# Patient Record
Sex: MALE | Race: WHITE | Employment: FULL TIME | URBAN - NONMETROPOLITAN AREA
[De-identification: names, ages, dates, MRNs, and addresses within clinical notes are randomized per-mention and may not be internally consistent; named-entity substitution may affect disease eponyms.]

---

## 2018-12-26 ENCOUNTER — APPOINTMENT (OUTPATIENT)
Dept: GENERAL RADIOLOGY | Age: 38
End: 2018-12-26
Payer: COMMERCIAL

## 2018-12-26 ENCOUNTER — HOSPITAL ENCOUNTER (EMERGENCY)
Age: 38
Discharge: HOME | End: 2018-12-27
Payer: COMMERCIAL

## 2018-12-26 VITALS
OXYGEN SATURATION: 97 % | RESPIRATION RATE: 16 BRPM | TEMPERATURE: 98.6 F | DIASTOLIC BLOOD PRESSURE: 97 MMHG | SYSTOLIC BLOOD PRESSURE: 138 MMHG | HEART RATE: 65 BPM

## 2018-12-26 DIAGNOSIS — M79.642 HAND PAIN, LEFT: ICD-10-CM

## 2018-12-26 DIAGNOSIS — I10 ESSENTIAL HYPERTENSION: ICD-10-CM

## 2018-12-26 DIAGNOSIS — R11.0 NAUSEA: Primary | ICD-10-CM

## 2018-12-26 LAB
ALBUMIN SERPL-MCNC: 4.7 G/DL (ref 3.5–5.1)
ALP BLD-CCNC: 43 U/L (ref 38–126)
ALT SERPL-CCNC: 52 U/L (ref 11–66)
AMPHETAMINE+METHAMPHETAMINE URINE SCREEN: NEGATIVE
ANION GAP SERPL CALCULATED.3IONS-SCNC: 16 MEQ/L (ref 8–16)
AST SERPL-CCNC: 55 U/L (ref 5–40)
BARBITURATE QUANTITATIVE URINE: NEGATIVE
BASOPHILS # BLD: 0.4 %
BASOPHILS ABSOLUTE: 0 THOU/MM3 (ref 0–0.1)
BENZODIAZEPINE QUANTITATIVE URINE: NEGATIVE
BILIRUB SERPL-MCNC: 0.3 MG/DL (ref 0.3–1.2)
BILIRUBIN DIRECT: < 0.2 MG/DL (ref 0–0.3)
BUN BLDV-MCNC: 15 MG/DL (ref 7–22)
CALCIUM SERPL-MCNC: 9.4 MG/DL (ref 8.5–10.5)
CANNABINOID QUANTITATIVE URINE: NEGATIVE
CHLORIDE BLD-SCNC: 98 MEQ/L (ref 98–111)
CO2: 27 MEQ/L (ref 23–33)
COCAINE METABOLITE QUANTITATIVE URINE: NEGATIVE
CREAT SERPL-MCNC: 0.9 MG/DL (ref 0.4–1.2)
EKG ATRIAL RATE: 79 BPM
EKG P AXIS: 67 DEGREES
EKG P-R INTERVAL: 154 MS
EKG Q-T INTERVAL: 400 MS
EKG QRS DURATION: 94 MS
EKG QTC CALCULATION (BAZETT): 458 MS
EKG R AXIS: 26 DEGREES
EKG T AXIS: 49 DEGREES
EKG VENTRICULAR RATE: 79 BPM
EOSINOPHIL # BLD: 2.8 %
EOSINOPHILS ABSOLUTE: 0.2 THOU/MM3 (ref 0–0.4)
ERYTHROCYTE [DISTWIDTH] IN BLOOD BY AUTOMATED COUNT: 13 % (ref 11.5–14.5)
ERYTHROCYTE [DISTWIDTH] IN BLOOD BY AUTOMATED COUNT: 41.2 FL (ref 35–45)
GFR SERPL CREATININE-BSD FRML MDRD: > 90 ML/MIN/1.73M2
GLUCOSE BLD-MCNC: 101 MG/DL (ref 70–108)
HCT VFR BLD CALC: 39.4 % (ref 42–52)
HEMOGLOBIN: 13.2 GM/DL (ref 14–18)
IMMATURE GRANS (ABS): 0.02 THOU/MM3 (ref 0–0.07)
IMMATURE GRANULOCYTES: 0.3 %
LIPASE: 26 U/L (ref 5.6–51.3)
LYMPHOCYTES # BLD: 27.8 %
LYMPHOCYTES ABSOLUTE: 2.1 THOU/MM3 (ref 1–4.8)
MCH RBC QN AUTO: 29.3 PG (ref 26–33)
MCHC RBC AUTO-ENTMCNC: 33.5 GM/DL (ref 32.2–35.5)
MCV RBC AUTO: 87.6 FL (ref 80–94)
MONOCYTES # BLD: 8.8 %
MONOCYTES ABSOLUTE: 0.7 THOU/MM3 (ref 0.4–1.3)
NUCLEATED RED BLOOD CELLS: 0 /100 WBC
OPIATES, URINE: NEGATIVE
OSMOLALITY CALCULATION: 282.2 MOSMOL/KG (ref 275–300)
OXYCODONE: NEGATIVE
PHENCYCLIDINE QUANTITATIVE URINE: NEGATIVE
PLATELET # BLD: 290 THOU/MM3 (ref 130–400)
PMV BLD AUTO: 9.5 FL (ref 9.4–12.4)
POTASSIUM SERPL-SCNC: 3.3 MEQ/L (ref 3.5–5.2)
RBC # BLD: 4.5 MILL/MM3 (ref 4.7–6.1)
SEG NEUTROPHILS: 59.9 %
SEGMENTED NEUTROPHILS ABSOLUTE COUNT: 4.6 THOU/MM3 (ref 1.8–7.7)
SODIUM BLD-SCNC: 141 MEQ/L (ref 135–145)
TOTAL PROTEIN: 7.4 G/DL (ref 6.1–8)
TROPONIN T: < 0.01 NG/ML
WBC # BLD: 7.7 THOU/MM3 (ref 4.8–10.8)

## 2018-12-26 PROCEDURE — 93005 ELECTROCARDIOGRAM TRACING: CPT | Performed by: PHYSICIAN ASSISTANT

## 2018-12-26 PROCEDURE — 99283 EMERGENCY DEPT VISIT LOW MDM: CPT

## 2018-12-26 PROCEDURE — 71046 X-RAY EXAM CHEST 2 VIEWS: CPT

## 2018-12-26 PROCEDURE — 83690 ASSAY OF LIPASE: CPT

## 2018-12-26 PROCEDURE — 36415 COLL VENOUS BLD VENIPUNCTURE: CPT

## 2018-12-26 PROCEDURE — 80307 DRUG TEST PRSMV CHEM ANLYZR: CPT

## 2018-12-26 PROCEDURE — 6360000002 HC RX W HCPCS: Performed by: NURSE PRACTITIONER

## 2018-12-26 PROCEDURE — 84484 ASSAY OF TROPONIN QUANT: CPT

## 2018-12-26 PROCEDURE — 85025 COMPLETE CBC W/AUTO DIFF WBC: CPT

## 2018-12-26 PROCEDURE — 82248 BILIRUBIN DIRECT: CPT

## 2018-12-26 PROCEDURE — 80053 COMPREHEN METABOLIC PANEL: CPT

## 2018-12-26 PROCEDURE — 6370000000 HC RX 637 (ALT 250 FOR IP): Performed by: NURSE PRACTITIONER

## 2018-12-26 RX ORDER — ONDANSETRON 4 MG/1
4 TABLET, ORALLY DISINTEGRATING ORAL ONCE
Status: COMPLETED | OUTPATIENT
Start: 2018-12-26 | End: 2018-12-26

## 2018-12-26 RX ORDER — POTASSIUM CHLORIDE 750 MG/1
40 TABLET, FILM COATED, EXTENDED RELEASE ORAL ONCE
Status: COMPLETED | OUTPATIENT
Start: 2018-12-26 | End: 2018-12-26

## 2018-12-26 RX ORDER — LOSARTAN POTASSIUM 100 MG/1
100 TABLET ORAL NIGHTLY
COMMUNITY
End: 2021-05-14 | Stop reason: SDUPTHER

## 2018-12-26 RX ADMIN — ONDANSETRON 4 MG: 4 TABLET, ORALLY DISINTEGRATING ORAL at 23:22

## 2018-12-26 RX ADMIN — POTASSIUM CHLORIDE 40 MEQ: 750 TABLET, EXTENDED RELEASE ORAL at 23:22

## 2018-12-26 ASSESSMENT — ENCOUNTER SYMPTOMS
COUGH: 0
VOMITING: 0
SHORTNESS OF BREATH: 0
NAUSEA: 1

## 2018-12-26 ASSESSMENT — PAIN DESCRIPTION - PAIN TYPE: TYPE: ACUTE PAIN

## 2018-12-26 ASSESSMENT — PAIN DESCRIPTION - FREQUENCY: FREQUENCY: CONTINUOUS

## 2018-12-26 ASSESSMENT — PAIN DESCRIPTION - LOCATION: LOCATION: HAND

## 2018-12-26 ASSESSMENT — PAIN DESCRIPTION - DESCRIPTORS: DESCRIPTORS: TINGLING

## 2018-12-26 ASSESSMENT — PAIN SCALES - GENERAL: PAINLEVEL_OUTOF10: 3

## 2018-12-27 PROCEDURE — 93010 ELECTROCARDIOGRAM REPORT: CPT | Performed by: INTERNAL MEDICINE

## 2018-12-27 RX ORDER — ONDANSETRON 4 MG/1
4 TABLET, ORALLY DISINTEGRATING ORAL EVERY 8 HOURS PRN
Qty: 20 TABLET | Refills: 0 | Status: SHIPPED | OUTPATIENT
Start: 2018-12-27 | End: 2021-03-16

## 2019-01-15 ENCOUNTER — HOSPITAL ENCOUNTER (OUTPATIENT)
Dept: NON INVASIVE DIAGNOSTICS | Age: 39
Discharge: HOME | End: 2019-01-15
Payer: COMMERCIAL

## 2019-01-15 VITALS — HEIGHT: 70 IN | WEIGHT: 180 LBS | BODY MASS INDEX: 25.77 KG/M2

## 2019-01-15 PROCEDURE — 93017 CV STRESS TEST TRACING ONLY: CPT | Performed by: INTERNAL MEDICINE

## 2020-02-11 ENCOUNTER — HOSPITAL ENCOUNTER (OUTPATIENT)
Dept: SLEEP CENTER | Age: 40
Discharge: HOME | End: 2020-02-13
Payer: COMMERCIAL

## 2020-02-11 ENCOUNTER — OFFICE VISIT (OUTPATIENT)
Dept: CARDIOLOGY CLINIC | Age: 40
End: 2020-02-11
Payer: COMMERCIAL

## 2020-02-11 ENCOUNTER — INITIAL CONSULT (OUTPATIENT)
Dept: PULMONOLOGY | Age: 40
End: 2020-02-11
Payer: COMMERCIAL

## 2020-02-11 VITALS
WEIGHT: 180.2 LBS | BODY MASS INDEX: 25.8 KG/M2 | DIASTOLIC BLOOD PRESSURE: 81 MMHG | SYSTOLIC BLOOD PRESSURE: 133 MMHG | HEART RATE: 76 BPM | HEIGHT: 70 IN

## 2020-02-11 VITALS
SYSTOLIC BLOOD PRESSURE: 122 MMHG | OXYGEN SATURATION: 97 % | HEIGHT: 70 IN | DIASTOLIC BLOOD PRESSURE: 72 MMHG | BODY MASS INDEX: 25.2 KG/M2 | HEART RATE: 71 BPM | WEIGHT: 176 LBS

## 2020-02-11 PROCEDURE — 1036F TOBACCO NON-USER: CPT | Performed by: INTERNAL MEDICINE

## 2020-02-11 PROCEDURE — G8484 FLU IMMUNIZE NO ADMIN: HCPCS | Performed by: INTERNAL MEDICINE

## 2020-02-11 PROCEDURE — G8419 CALC BMI OUT NRM PARAM NOF/U: HCPCS | Performed by: INTERNAL MEDICINE

## 2020-02-11 PROCEDURE — 95810 POLYSOM 6/> YRS 4/> PARAM: CPT

## 2020-02-11 PROCEDURE — 93000 ELECTROCARDIOGRAM COMPLETE: CPT | Performed by: INTERNAL MEDICINE

## 2020-02-11 PROCEDURE — G8427 DOCREV CUR MEDS BY ELIG CLIN: HCPCS | Performed by: INTERNAL MEDICINE

## 2020-02-11 PROCEDURE — 99203 OFFICE O/P NEW LOW 30 MIN: CPT | Performed by: INTERNAL MEDICINE

## 2020-02-11 PROCEDURE — 99244 OFF/OP CNSLTJ NEW/EST MOD 40: CPT | Performed by: INTERNAL MEDICINE

## 2020-02-11 RX ORDER — ASCORBIC ACID 1000 MG
TABLET ORAL DAILY
COMMUNITY

## 2020-02-11 NOTE — PROGRESS NOTES
Mallampati Score: 4    General appearance: alert and oriented to person, place and time BMI 25  Nose: Nares normal. Septum midline. Mucosa normal. No drainage or sinus tenderness. Oropharynx:  Normal  tongue, crowded pharynx, mallampati class 2, tonsils grade 1  Lungs: clear to auscultation bilaterally  Heart: regular rate and rhythm, S1, S2 normal, no murmur, click, rub or gallop  Extremities: extremities normal, atraumatic, no cyanosis or edema  Neurologic: Mental status: Alert, oriented, thought content appropriate      Assessment   Orders Placed This Encounter   Procedures    Baseline Diagnostic Sleep Study     Standing Status:   Future     Standing Expiration Date:   2/11/2021     Order Specific Question:   Adult or Pediatric     Answer:   Adult Study (>7 Years)     Order Specific Question:   Location For Sleep Study     Answer:   [address] [facility]     Order Specific Question:   Select Sleep Lab Location     Answer:   [address]      PSG  Mask Desensitization and Pre study teaching? No  Weight Loss Information Given? No  Sleep Hygiene Discussed?  Yes

## 2020-02-11 NOTE — COMMUNICATION BODY
Comment: moderate    Drug use: No       No Known Allergies    Current Outpatient Medications   Medication Sig Dispense Refill    aspirin 81 MG tablet Take 81 mg by mouth daily      Coenzyme Q10 (CO Q 10) 10 MG CAPS Take by mouth      losartan (COZAAR) 100 MG tablet Take 100 mg by mouth daily       ibuprofen (ADVIL;MOTRIN) 200 MG tablet Take 200 mg by mouth as needed for Pain.  Cholecalciferol (VITAMIN D-3 PO) Take  by mouth daily. 1-2 tabs      simvastatin (ZOCOR) 20 MG tablet Take 20 mg by mouth nightly.  MULTIPLE VITAMIN PO Take  by mouth daily.  ondansetron (ZOFRAN ODT) 4 MG disintegrating tablet Take 1 tablet by mouth every 8 hours as needed for Nausea (Patient not taking: Reported on 2/11/2020) 20 tablet 0    fexofenadine (ALLEGRA) 180 MG tablet Take 180 mg by mouth daily as needed.  fluticasone (FLONASE) 50 MCG/ACT nasal spray 1 spray by Nasal route as needed. No current facility-administered medications for this visit. Family History   Problem Relation Age of Onset    High Cholesterol Father     Other Father         afib    Stroke Maternal Grandfather     Heart Disease Paternal Grandmother     Stroke Paternal Grandfather     Prostate Cancer Neg Hx         Any family history of any sleep problems or any one in your family on CPAP? Yes    Caffeine Intake: How much soda (pop), coffee, tea, power drinks do you ingest per day? 3 per day. Employment History:  Where do you work? [employer], [employer]  What are your shifts? 8 am to 4 pm    Any recent changes in shifts or hours? no    Physical Exam:    HEIGHTHeight: 5' 10\" (177.8 cm) WEIGHTWeight: 176 lb (79.8 kg)    BMI:  Body mass index is 25.25 kg/m².   Neck Size: 15.5 inches   Oxygen Sat: room air    ESS: 9   Vitals: /72 (Site: Left Upper Arm, Position: Sitting, Cuff Size: Medium Adult)   Pulse 71   Ht 5' 10\" (1.778 m)   Wt 176 lb (79.8 kg)   SpO2 97% Comment: on room air at rest  BMI 25.25 kg/m²

## 2020-02-11 NOTE — PROGRESS NOTES
NPO after midnight  [employer] and drivers license  Wear comfortable clean clothing  Do not bring jewelry   Shower night before and morning of surgery with a liquid antibacterial soap  Bring medications in original bottles  Follow all instructions give by your physician   needed at discharge  Call [phone] for any questions

## 2020-02-11 NOTE — PROGRESS NOTES
abnormalities. A complete report on coronary calcification scoring  will be provided by [facility] ([facility]) Cardiology. Calcified mediastinal lymph nodes consistent with remote granulomatous disease. Mild atherosclerotic change. The thoracic spine demonstrates moderate  degenerative changes at multiple levels. IMPRESSION:   No significant noncardiac abnormality of the visualized portions of the chest or  abdomen. A complete report on coronary calcification scoring will be provided by [facility] ([facility]) Cardiology. Current Outpatient Medications:     aspirin 81 MG tablet, Take 81 mg by mouth daily, Disp: , Rfl:     Coenzyme Q10 (CO Q 10) 10 MG CAPS, Take by mouth, Disp: , Rfl:     ondansetron (ZOFRAN ODT) 4 MG disintegrating tablet, Take 1 tablet by mouth every 8 hours as needed for Nausea (Patient not taking: Reported on 2/11/2020), Disp: 20 tablet, Rfl: 0    losartan (COZAAR) 100 MG tablet, Take 100 mg by mouth daily , Disp: , Rfl:     ibuprofen (ADVIL;MOTRIN) 200 MG tablet, Take 200 mg by mouth as needed for Pain., Disp: , Rfl:     Cholecalciferol (VITAMIN D-3 PO), Take  by mouth daily. 1-2 tabs, Disp: , Rfl:     simvastatin (ZOCOR) 20 MG tablet, Take 20 mg by mouth nightly.  , Disp: , Rfl:     fexofenadine (ALLEGRA) 180 MG tablet, Take 180 mg by mouth daily as needed. , Disp: , Rfl:     fluticasone (FLONASE) 50 MCG/ACT nasal spray, 1 spray by Nasal route as needed. , Disp: , Rfl:     MULTIPLE VITAMIN PO, Take  by mouth daily. , Disp: , Rfl:     Past Medical History  July Reveles  has a past medical history of AR (allergic rhinitis), Hypercholesteremia, and Hypertension. Social History  July Reveles  reports that he has never smoked. He has never used smokeless tobacco. He reports current alcohol use. He reports that he does not use drugs.     Family History  July Reveles family history includes Heart Disease in his paternal grandmother; High Cholesterol in his father; medicine for DEANDRA evaluation, sleep study is planned. Exercise EKG stress test in 01/2019 was negative for ischemia. He recently underwent coronary CT scan on 1/24/2020. He has family Hx of CAD. Father had A Fib, heart surgery. Hi grandmother had CABG. Coronary CT scan was done and patient was referred after it was found to be abnormal. Coronary calcium score was 217, above 97th percentile. The test was ordered by PCP. The patient was previously seen for chest pain last year, stress EKG was negative. He states that he gets tired after physical activity and develops dyspnea. Patient denies chest pain, orthopnea, paroxysmal nocturnal dyspnea, palpitations, dizziness, syncope, weight gain or leg swelling. 1. Abnormal coronary   2. High calcium score  3. KOHLI  4. Fatigue  5. HTN  6. Dyslipidemia     A decision was made to proceed with cardiac catheterization with possible PCI as it it the recommended procedure for the patient. The indication, risks and benefits of the procedure and possible therapeutic consequences and alternatives were discussed with the patient. The patient was given the opportunity to ask questions and to have them answered to his/her satisfaction. Risks of the procedure include but are not limited to the following: Bleeding, hematoma including retroperitoneal hematoma, infection, pain and discomfort, injury to the aorta and other blood vessels, rhythm disturbance, low blood pressure, myocardial infarction, need for bypass surgery, stroke, kidney damage/failure, myocardial perforation, allergic reactions to sedatives/contrast material, loss of pulse/vascular compromise requiring surgery, aneurysm/pseudoaneurysm formation, possible loss of a limb/hand/leg, death. Alternatives to and omission of the suggested procedure were discussed. The patient also understands the need for DAPT if PCI is necessary. The patient had no further questions and wished to proceed; the consent form was signed.   [facility] ordered  Will need to investigate for underlying structural heart disease, will proceed with a transthoracic echocardiogram   Asa 81 mg po daily  Patient was advised to report to the ER if he has recurrent symptoms with specific instructions given about severity and duration of symptoms  The patient was instructed to check her blood pressure at home, and record the readings. She will call office if blood pressure readings are high or low, will further adjust her antihypertensive medications accordingly         Above findings and plan of care were discussed with patient in details, patient's questions were answered.      Disposition:  RTC in 12 months       Electronically signed by Toro Lay MD, [facility]    2/11/2020 at 12:34 PM

## 2020-02-12 LAB — STATUS: NORMAL

## 2020-02-14 ENCOUNTER — OFFICE VISIT (OUTPATIENT)
Dept: PULMONOLOGY | Age: 40
End: 2020-02-14
Payer: COMMERCIAL

## 2020-02-14 VITALS
OXYGEN SATURATION: 100 % | BODY MASS INDEX: 25.83 KG/M2 | HEART RATE: 64 BPM | SYSTOLIC BLOOD PRESSURE: 126 MMHG | HEIGHT: 70 IN | WEIGHT: 180.4 LBS | DIASTOLIC BLOOD PRESSURE: 72 MMHG

## 2020-02-14 PROCEDURE — 99214 OFFICE O/P EST MOD 30 MIN: CPT | Performed by: PHYSICIAN ASSISTANT

## 2020-02-14 PROCEDURE — G8427 DOCREV CUR MEDS BY ELIG CLIN: HCPCS | Performed by: PHYSICIAN ASSISTANT

## 2020-02-14 PROCEDURE — G8484 FLU IMMUNIZE NO ADMIN: HCPCS | Performed by: PHYSICIAN ASSISTANT

## 2020-02-14 PROCEDURE — G8419 CALC BMI OUT NRM PARAM NOF/U: HCPCS | Performed by: PHYSICIAN ASSISTANT

## 2020-02-14 PROCEDURE — 1036F TOBACCO NON-USER: CPT | Performed by: PHYSICIAN ASSISTANT

## 2020-02-14 ASSESSMENT — ENCOUNTER SYMPTOMS
COUGH: 0
DIARRHEA: 0
WHEEZING: 0
STRIDOR: 0
BACK PAIN: 0
SHORTNESS OF BREATH: 0
ALLERGIC/IMMUNOLOGIC NEGATIVE: 1
CHEST TIGHTNESS: 0
EYES NEGATIVE: 1
NAUSEA: 0

## 2020-02-14 NOTE — PROGRESS NOTES
[facility] [facility]    Dallin Pereira, 37 y.o.  [phone]      Pt of [facility]  Nurses Notes   Yolanda Garcia is here for a follow up after a sleep study 2/11/20  Study Results  Initial Study Date -  2/11/20  AHI -  12.9    TotalEvents -86  (Apneas  71  Hypopneas 15  Central  0)  LM w/Arousals - 3  Sleep Efficiency - 87.6 % (Total Sleep Time - 399 min)  Time with Sats below 88% - 0.2 min  Interval History       Dallin Pereira is a 37 y.o. old male who comes in to review the results of his recent sleep study, to answer questions and to explore options for treatment.  he continues to have symptoms of snoring, periods of not breathing, excessive daytime sleepiness    PMHx  Past Medical History:   Diagnosis Date    AR (allergic rhinitis)     Hypercholesteremia     BORDERLINE    Hypertension      Past Surgical History:   Procedure Laterality Date    CARDIOVASCULAR STRESS TEST      INGUINAL HERNIA REPAIR Right 6-13-14    Robot assisted-[facility]    STOMACH SURGERY  1978    Pyloric Stenosis    WISDOM TOOTH EXTRACTION  1996     Social History     Tobacco Use    Smoking status: Never Smoker    Smokeless tobacco: Never Used   Substance Use Topics    Alcohol use: Yes     Types: 4 Cans of beer per week     Comment: moderate    Drug use: No     Family History   Problem Relation Age of Onset    High Cholesterol Father     Other Father         afib    Stroke Maternal Grandfather     Heart Disease Paternal Grandmother     Stroke Paternal Grandfather     Prostate Cancer Neg Hx      Allergies  No Known Allergies  Meds  Current Outpatient Medications   Medication Sig Dispense Refill    aspirin 81 MG tablet Take 81 mg by mouth daily      Coenzyme Q10 (CO Q 10) 10 MG CAPS Take by mouth daily       ondansetron (ZOFRAN ODT) 4 MG disintegrating tablet Take 1 tablet by mouth every 8 hours as needed for Nausea 20 tablet 0    losartan (COZAAR) 100 MG tablet Take 100 mg by mouth nightly      

## 2020-02-17 NOTE — PROGRESS NOTES
[address] [place] [address]                               SLEEP STUDY REPORT    PATIENT NAME: Gerson Núñez                  :        1980  MED REC NO:   [id number]                           ROOM:  ACCOUNT NO:   [de-identified]                           ADMIT DATE: 2020  PROVIDER:     LATANYA Mancilla STUDY:  2020    REFERRING PROVIDER:  Arbutus Essex, MD    HISTORY OF PRESENT ILLNESS:  The patient is a 40-year-old gentleman. Complains of snoring, nonrestorative sleep, fragmented sleep with  frequent arousals. Weight 176 pounds, height 70 inches, BMI 25.3. METHODS:  The patient underwent digital polysomnography in compliance  with the standards and specifications from the AASM Manual including the  simultaneous recording of 3 EEG channels (F4-M1, C4-M1, and O2-M1 with  back up electrodes F3-M2, C3-M2, and O1-M2), 2 EOG channels (E1-M2, and  E2-M1,), EMG (chin, left & right leg), EKG, Nonin pulse oximetry with  less than 2 second averaging time, body position, airflow recorded by  oral-nasal thermal sensor and nasal air pressure transducer, plus  respiratory effort recorded by calibrated respiratory inductance  plethysmography (RIP), flow volume loop, sound and video. Sleep staging  and scoring followed the standard put forth by the American Academy of  Sleep Medicine and utilized the 4A obstructive hypopnea event  desaturation of 4 percent or greater. DETAILS OF THE STUDY:  Lights out 09:35 p.m., lights on 05:11 a.m. Total recording time 455 minutes, sleep period time 421 minutes, total  sleep time 399 minutes, sleep efficiency 87.6%, latency to sleep 33.6  minutes, wake after sleep onset 22.8 minutes, latency to REM 71 minutes. SLEEP STAGING:  The patient slept 20 minutes in N1 sleep, 266 minutes in  N2 sleep, 52 minutes in N3 sleep and 61 minutes in REM sleep.     RESPIRATORY SUMMARY:  70 obstructive apneas, 15 obstructive hypopneas  for an AHI of 12.9 which worsened during REM sleep with a REM AHI of  32.5. The supine AHI was 14.6. BODY POSITION SUMMARY:  The patient slept 353 minutes supine, 45 minutes  on the left side. SLEEP DISRUPTION SUMMARY:  There were 105 arousals for an arousal index  of 15.8.  84 of these arousals were caused by respiratory obstructions  for a respiratory arousal index of 12.6. PERIODIC LIMB MOVEMENT SUMMARY:  There were 37 events for a PLM index of  5.6. PULSE OXIMETRY SUMMARY:  Mean oxygen saturation during wakefulness  95.2%, during sleep 94.4%, lowest oxygen saturation 87%. There were 0.2  minutes of oxygen saturation below 88%. ECG SUMMARY:  The patient remained in normal sinus rhythm during the  night. PACs and PVCs were recorded. IMPRESSION:  Mild obstructive sleep apnea with an AHI of 12.9 which  becomes severe during REM sleep with a REM AHI of 32.5 associated to  sleep disruption. RECOMMENDATIONS:  Appropriate therapies for mild obstructive sleep apnea  include weight reduction, positional therapy with lateral sleep, oral  appliances and PAP devices. The patient will be followed up in the  sleep clinic. The findings and therapeutic recommendations will be  reviewed and we will proceed accordingly.         Michell Daily M.D.    D: 02/16/2020 59:57:23       T: 02/16/2020 20:30:56     [email or web address]  Job#: [id number]     Doc#: [id number]    CC:

## 2020-02-18 ENCOUNTER — PREP FOR PROCEDURE (OUTPATIENT)
Dept: CARDIOLOGY | Age: 40
End: 2020-02-18

## 2020-02-18 RX ORDER — SODIUM CHLORIDE 9 MG/ML
INJECTION, SOLUTION INTRAVENOUS CONTINUOUS
Status: CANCELLED | OUTPATIENT
Start: 2020-02-18

## 2020-02-18 RX ORDER — ASPIRIN 325 MG
325 TABLET ORAL ONCE
Status: CANCELLED | OUTPATIENT
Start: 2020-02-18 | End: 2020-02-18

## 2020-02-18 RX ORDER — DIPHENHYDRAMINE HYDROCHLORIDE 50 MG/ML
50 INJECTION INTRAMUSCULAR; INTRAVENOUS ONCE
Status: CANCELLED | OUTPATIENT
Start: 2020-02-18 | End: 2020-02-18

## 2020-02-18 RX ORDER — NITROGLYCERIN 0.4 MG/1
0.4 TABLET SUBLINGUAL EVERY 5 MIN PRN
Status: CANCELLED | OUTPATIENT
Start: 2020-02-18

## 2020-02-18 RX ORDER — SODIUM CHLORIDE 0.9 % (FLUSH) 0.9 %
10 SYRINGE (ML) INJECTION PRN
Status: CANCELLED | OUTPATIENT
Start: 2020-02-18

## 2020-02-18 RX ORDER — SODIUM CHLORIDE 0.9 % (FLUSH) 0.9 %
10 SYRINGE (ML) INJECTION EVERY 12 HOURS SCHEDULED
Status: CANCELLED | OUTPATIENT
Start: 2020-02-18

## 2020-02-19 ENCOUNTER — HOSPITAL ENCOUNTER (OUTPATIENT)
Dept: INPATIENT UNIT | Age: 40
Discharge: HOME | End: 2020-02-19
Attending: INTERNAL MEDICINE | Admitting: INTERNAL MEDICINE
Payer: COMMERCIAL

## 2020-02-19 ENCOUNTER — APPOINTMENT (OUTPATIENT)
Dept: CARDIAC CATH/INVASIVE PROCEDURES | Age: 40
End: 2020-02-19
Attending: INTERNAL MEDICINE
Payer: COMMERCIAL

## 2020-02-19 VITALS
RESPIRATION RATE: 14 BRPM | HEART RATE: 58 BPM | HEIGHT: 70 IN | DIASTOLIC BLOOD PRESSURE: 68 MMHG | OXYGEN SATURATION: 97 % | SYSTOLIC BLOOD PRESSURE: 106 MMHG | TEMPERATURE: 97.7 F | WEIGHT: 180 LBS | BODY MASS INDEX: 25.77 KG/M2

## 2020-02-19 LAB
ABO: NORMAL
ANION GAP SERPL CALCULATED.3IONS-SCNC: 13 MEQ/L (ref 8–16)
ANTIBODY SCREEN: NORMAL
APTT: 32.8 SECONDS (ref 22–38)
BUN BLDV-MCNC: 20 MG/DL (ref 7–22)
CALCIUM SERPL-MCNC: 9.4 MG/DL (ref 8.5–10.5)
CHLORIDE BLD-SCNC: 104 MEQ/L (ref 98–111)
CHOLESTEROL, TOTAL: 145 MG/DL (ref 100–199)
CO2: 27 MEQ/L (ref 23–33)
CREAT SERPL-MCNC: 1 MG/DL (ref 0.4–1.2)
EKG ATRIAL RATE: 59 BPM
EKG P AXIS: 67 DEGREES
EKG P-R INTERVAL: 170 MS
EKG Q-T INTERVAL: 406 MS
EKG QRS DURATION: 84 MS
EKG QTC CALCULATION (BAZETT): 401 MS
EKG R AXIS: 19 DEGREES
EKG T AXIS: 12 DEGREES
EKG VENTRICULAR RATE: 59 BPM
ERYTHROCYTE [DISTWIDTH] IN BLOOD BY AUTOMATED COUNT: 13 % (ref 11.5–14.5)
ERYTHROCYTE [DISTWIDTH] IN BLOOD BY AUTOMATED COUNT: 43.8 FL (ref 35–45)
GFR SERPL CREATININE-BSD FRML MDRD: 82 ML/MIN/1.73M2
GLUCOSE BLD-MCNC: 92 MG/DL (ref 70–108)
HCT VFR BLD CALC: 40.9 % (ref 42–52)
HDLC SERPL-MCNC: 57 MG/DL
HEMOGLOBIN: 13.2 GM/DL (ref 14–18)
INR BLD: 0.98 (ref 0.85–1.13)
LDL CHOLESTEROL CALCULATED: 77 MG/DL
LV EF: 58 %
LVEF MODALITY: NORMAL
MCH RBC QN AUTO: 29.7 PG (ref 26–33)
MCHC RBC AUTO-ENTMCNC: 32.3 GM/DL (ref 32.2–35.5)
MCV RBC AUTO: 92.1 FL (ref 80–94)
PLATELET # BLD: 242 THOU/MM3 (ref 130–400)
PMV BLD AUTO: 10.6 FL (ref 9.4–12.4)
POTASSIUM REFLEX MAGNESIUM: 3.9 MEQ/L (ref 3.5–5.2)
RBC # BLD: 4.44 MILL/MM3 (ref 4.7–6.1)
RH FACTOR: NORMAL
SODIUM BLD-SCNC: 144 MEQ/L (ref 135–145)
TRIGL SERPL-MCNC: 56 MG/DL (ref 0–199)
WBC # BLD: 4.3 THOU/MM3 (ref 4.8–10.8)

## 2020-02-19 PROCEDURE — 93306 TTE W/DOPPLER COMPLETE: CPT

## 2020-02-19 PROCEDURE — 86901 BLOOD TYPING SEROLOGIC RH(D): CPT

## 2020-02-19 PROCEDURE — 6360000002 HC RX W HCPCS

## 2020-02-19 PROCEDURE — 2580000003 HC RX 258: Performed by: NURSE PRACTITIONER

## 2020-02-19 PROCEDURE — 93458 L HRT ARTERY/VENTRICLE ANGIO: CPT | Performed by: INTERNAL MEDICINE

## 2020-02-19 PROCEDURE — 85027 COMPLETE CBC AUTOMATED: CPT

## 2020-02-19 PROCEDURE — 93459 L HRT ART/GRFT ANGIO: CPT

## 2020-02-19 PROCEDURE — 85730 THROMBOPLASTIN TIME PARTIAL: CPT

## 2020-02-19 PROCEDURE — 2500000003 HC RX 250 WO HCPCS

## 2020-02-19 PROCEDURE — C1894 INTRO/SHEATH, NON-LASER: HCPCS

## 2020-02-19 PROCEDURE — 80048 BASIC METABOLIC PNL TOTAL CA: CPT

## 2020-02-19 PROCEDURE — 86900 BLOOD TYPING SEROLOGIC ABO: CPT

## 2020-02-19 PROCEDURE — 85610 PROTHROMBIN TIME: CPT

## 2020-02-19 PROCEDURE — 36415 COLL VENOUS BLD VENIPUNCTURE: CPT

## 2020-02-19 PROCEDURE — 86850 RBC ANTIBODY SCREEN: CPT

## 2020-02-19 PROCEDURE — 2709999900 HC NON-CHARGEABLE SUPPLY

## 2020-02-19 PROCEDURE — 6360000004 HC RX CONTRAST MEDICATION: Performed by: INTERNAL MEDICINE

## 2020-02-19 PROCEDURE — 93005 ELECTROCARDIOGRAM TRACING: CPT | Performed by: NURSE PRACTITIONER

## 2020-02-19 PROCEDURE — 80061 LIPID PANEL: CPT

## 2020-02-19 PROCEDURE — C1769 GUIDE WIRE: HCPCS

## 2020-02-19 PROCEDURE — 93458 L HRT ARTERY/VENTRICLE ANGIO: CPT

## 2020-02-19 PROCEDURE — 94760 N-INVAS EAR/PLS OXIMETRY 1: CPT

## 2020-02-19 RX ORDER — SODIUM CHLORIDE 9 MG/ML
INJECTION, SOLUTION INTRAVENOUS CONTINUOUS
Status: DISCONTINUED | OUTPATIENT
Start: 2020-02-19 | End: 2020-02-19 | Stop reason: HOSPADM

## 2020-02-19 RX ORDER — SODIUM CHLORIDE 0.9 % (FLUSH) 0.9 %
10 SYRINGE (ML) INJECTION PRN
Status: DISCONTINUED | OUTPATIENT
Start: 2020-02-19 | End: 2020-02-19 | Stop reason: HOSPADM

## 2020-02-19 RX ORDER — ACETAMINOPHEN 325 MG/1
650 TABLET ORAL EVERY 4 HOURS PRN
Status: DISCONTINUED | OUTPATIENT
Start: 2020-02-19 | End: 2020-02-19 | Stop reason: HOSPADM

## 2020-02-19 RX ORDER — SODIUM CHLORIDE 0.9 % (FLUSH) 0.9 %
10 SYRINGE (ML) INJECTION EVERY 12 HOURS SCHEDULED
Status: DISCONTINUED | OUTPATIENT
Start: 2020-02-19 | End: 2020-02-19 | Stop reason: HOSPADM

## 2020-02-19 RX ORDER — NITROGLYCERIN 0.4 MG/1
0.4 TABLET SUBLINGUAL EVERY 5 MIN PRN
Status: DISCONTINUED | OUTPATIENT
Start: 2020-02-19 | End: 2020-02-19 | Stop reason: HOSPADM

## 2020-02-19 RX ORDER — DIPHENHYDRAMINE HYDROCHLORIDE 50 MG/ML
50 INJECTION INTRAMUSCULAR; INTRAVENOUS ONCE
Status: DISCONTINUED | OUTPATIENT
Start: 2020-02-19 | End: 2020-02-19 | Stop reason: HOSPADM

## 2020-02-19 RX ORDER — ASPIRIN 325 MG
325 TABLET ORAL ONCE
Status: DISCONTINUED | OUTPATIENT
Start: 2020-02-19 | End: 2020-02-19 | Stop reason: HOSPADM

## 2020-02-19 RX ADMIN — IOPAMIDOL 50 ML: 755 INJECTION, SOLUTION INTRAVENOUS at 10:48

## 2020-02-19 RX ADMIN — SODIUM CHLORIDE: 9 INJECTION, SOLUTION INTRAVENOUS at 06:30

## 2020-02-19 ASSESSMENT — PAIN SCALES - GENERAL: PAINLEVEL_OUTOF10: 0

## 2020-02-19 NOTE — PROCEDURES
[address] [id number]                            CARDIAC CATHETERIZATION    PATIENT NAME: Carlos Mc                  :        1980  MED REC NO:   [id number]                           ROOM:       0004  ACCOUNT NO:   [de-identified]                           ADMIT DATE: 2020  PROVIDER:     Bon Greene MD    DATE OF PROCEDURE:  2020    INDICATIONS:  1. Worsening dyspnea on exertion. 2.  Angina equivalent symptoms. 3.  Hypertension. 4.  Dyslipidemia. 5.  Abnormal coronary CT scan. 6.  Family history of CAD. PROCEDURES PERFORMED:  1. Left cardiac catheterization with selective coronary angiogram.  2.  Left ventriculography. DESCRIPTION OF PROCEDURE:  After informed consent, the patient was  brought to the cardiac catheterization room. He was prepped and draped  in a sterile fashion. 2% lidocaine was injected in the skin and  subcutaneous tissue overlying the right radial artery. Using ultrasound  guidance and via modified Seldinger technique, I was able to obtain  access in the right radial artery. A 5/6 slender sheath was inserted in  the right radial artery and standard antithrombotic/antispasmodic  medications were given through the right radial sheath. I then  proceeded with diagnostic coronary angiogram and left ventriculography. JR4 diagnostic catheter 6-Sami was used for LV gram.  I tried to  disengage the RCA, however, this catheter and later the 6-Sami Tiger  catheter which successfully engaged the left main coronary artery were  giving selective engagement into the conus branch with damping of  pressure. Eventually, I switched the catheter to multipurpose catheter  which successfully engaged the RCA. FINDINGS:    HEMODYNAMICS:  LVEDP 60 mmHg. LEFT VENTRICULOGRAPHY:  No regional wall motion abnormality noted. Ejection fraction estimated at 60%. CORONARY ANGIOGRAM:  1.

## 2020-02-19 NOTE — PROGRESS NOTES
Care taken over from cath lab. Right radial site stable, no bleeding seen, site soft. Armboard continued with vascband. Patient instructed not to bend wrist, not to put pressure on wrist and not to lift or twist with wrist. Patient voices understanding. Family has spoken to Dr Julia Payan. 0.9 NS infusing. Pt tolerating sips of water. Will continue to [facility].

## 2020-02-19 NOTE — PRE SEDATION
[address] [address]  Sedation/Analgesia History & Physical    Pt Name: Sadiq Alberto  Account number: [de-identified]  MRN: [id number]  YOB: 1980  Provider Performing Procedure: Yonas Bird MD  Referring Provider: Yonas Bird MD   Primary Care Physician: Matt Baltazar MD  Date: 2/19/2020    PRE-PROCEDURE    Code Status: FULL CODE  Brief History/Pre-Procedure Diagnosis:     1. Abnormal coronary   2. High calcium score  3. Worsening KOHLI  4. Angina equivalent symptoms   5. Fatigue  6. HTN  7. Dyslipidemia   8. FH of CAD    Consent: : I have discussed with the patient risks, benefits, and alternatives (and relevant risks, benefits, and side effects related to alternatives or not receiving care), and likelihood of the success. The patient and/or representative appear to understand and agree to proceed. The discussion encompasses risks, benefits, and side effects related to the alternatives and the risks related to not receiving the proposed care, treatment, and services. The indication, risks and benefits of the procedure and possible therapeutic consequences and alternatives were discussed with the patient. The patient was given the opportunity to ask questions and to have them answered to his/her satisfaction.  Risks of the procedure include but are not limited to the following: Bleeding, hematoma including retroperitoneal hemmorhage, infection, pain and discomfort, injury to the aorta and other blood vessels, rhythm disturbance, low blood pressure, myocardial infarction, stroke, kidney damage/failure, myocardial perforation, allergic reactions to sedatives/contrast material, loss of pulse/vascular compromise requiring surgery, aneurysm/pseudoaneurysm formation, possible loss of a limb/hand/leg, needing blood transfusion, requiring emergent open heart surgery or emergent coronary intervention, the need for intubation/respiratory support, the requirement for defibrillation/cardioversion, MG CAPS Take by mouth daily    Yes Historical Provider, MD   losartan (COZAAR) 100 MG tablet Take 100 mg by mouth nightly    Yes Historical Provider, MD   Cholecalciferol (VITAMIN D-3 PO) Take  by mouth daily. 1-2 tabs   Yes Historical Provider, MD   simvastatin (ZOCOR) 20 MG tablet Take 20 mg by mouth nightly. Yes Historical Provider, MD   MULTIPLE VITAMIN PO Take  by mouth daily. Yes Historical Provider, MD   ondansetron (ZOFRAN ODT) 4 MG disintegrating tablet Take 1 tablet by mouth every 8 hours as needed for Nausea 12/27/18   Dwana Current, APRN - CNP   ibuprofen (ADVIL;MOTRIN) 200 MG tablet Take 200 mg by mouth as needed for Pain. Historical Provider, MD   fexofenadine (ALLEGRA) 180 MG tablet Take 180 mg by mouth daily as needed. Historical Provider, MD   fluticasone (FLONASE) 50 MCG/ACT nasal spray 1 spray by Nasal route as needed. Historical Provider, MD     Additional information:       VITAL SIGNS   Vitals:    02/19/20 0901   BP:    Pulse:    Resp:    Temp:    SpO2: 97%       PHYSICAL:   General: No acute distress  HEENT:  Unremarkable for age  Neck: without increased JVD, carotid pulses 2+ bilaterally without bruits  Heart: RRR, S1 & S2 WNL, S4 gallop, without murmurs or rubs   NYHA: III   Lungs: Clear to auscultation    Abdomen: BS present, without HSM, masses, or tenderness    Extremities: without C,C,E.  Pulses 2+ bilaterally  Mental Status: Alert & Oriented        PLANNED PROCEDURE   [x]Cath  [x]PCI                []Pacemaker/AICD  []GLENROY             []Cardioversion []Peripheral angiography/PTA  []Other:      SEDATION  Planned agent:[x]Midazolam []Meperidine []Sublimaze []Morphine  []Diazepam  []Other:     ASA Classification:  []1 [x]2 []3 []4 []5   Class 1: A normal healthy patient  Class 2: Pt with mild to moderate systemic disease  Class 3: Severe systemic disease or disturbance  Class 4: Severe systemic disorders that are already life threatening.   Class 5: Moribund pt with

## 2020-02-19 NOTE — PROGRESS NOTES
Pt admitted to  2E04 ambulatory for cardiac cath. Pt NPO. Patient accompanied by his wife, Vivien See. Vital signs obtained. Assessment and data collection initiated. Oriented to room. Policies and procedures for 2E explained   All questions answered with no further questions at this time. Fall prevention and safety precautions discussed with patient.

## 2020-02-20 ENCOUNTER — HOSPITAL ENCOUNTER (EMERGENCY)
Age: 40
Discharge: HOME | End: 2020-02-20
Payer: COMMERCIAL

## 2020-02-20 ENCOUNTER — APPOINTMENT (OUTPATIENT)
Dept: INTERVENTIONAL RADIOLOGY/VASCULAR | Age: 40
End: 2020-02-20
Payer: COMMERCIAL

## 2020-02-20 VITALS
BODY MASS INDEX: 24.39 KG/M2 | SYSTOLIC BLOOD PRESSURE: 127 MMHG | OXYGEN SATURATION: 97 % | RESPIRATION RATE: 15 BRPM | WEIGHT: 170 LBS | HEART RATE: 51 BPM | DIASTOLIC BLOOD PRESSURE: 77 MMHG | TEMPERATURE: 98 F

## 2020-02-20 PROCEDURE — 93010 ELECTROCARDIOGRAM REPORT: CPT | Performed by: INTERNAL MEDICINE

## 2020-02-20 PROCEDURE — 99284 EMERGENCY DEPT VISIT MOD MDM: CPT

## 2020-02-20 PROCEDURE — 93931 UPPER EXTREMITY STUDY: CPT

## 2020-02-20 ASSESSMENT — ENCOUNTER SYMPTOMS
SHORTNESS OF BREATH: 0
BACK PAIN: 0
CONSTIPATION: 0
RHINORRHEA: 0
NAUSEA: 0
DIARRHEA: 0
COUGH: 0
ABDOMINAL PAIN: 0
VOMITING: 0
SORE THROAT: 0

## 2020-02-20 NOTE — ED PROVIDER NOTES
test.    CURRENT MEDICATIONS       Discharge Medication List as of 2/20/2020 11:18 AM      CONTINUE these medications which have NOT CHANGED    Details   aspirin 81 MG tablet Take 81 mg by mouth dailyHistorical Med      Coenzyme Q10 (CO Q 10) 10 MG CAPS Take by mouth daily Historical Med      ondansetron (ZOFRAN ODT) 4 MG disintegrating tablet Take 1 tablet by mouth every 8 hours as needed for Nausea, Disp-20 tablet, R-0Print      losartan (COZAAR) 100 MG tablet Take 100 mg by mouth nightly Historical Med      ibuprofen (ADVIL;MOTRIN) 200 MG tablet Take 200 mg by mouth as needed for Pain. Cholecalciferol (VITAMIN D-3 PO) Take  by mouth daily. 1-2 tabs      simvastatin (ZOCOR) 20 MG tablet Take 20 mg by mouth nightly. fexofenadine (ALLEGRA) 180 MG tablet Take 180 mg by mouth daily as needed. fluticasone (FLONASE) 50 MCG/ACT nasal spray 1 spray by Nasal route as needed. MULTIPLE VITAMIN PO Take  by mouth daily. ALLERGIES     has No Known Allergies. FAMILY HISTORY     He indicated that his mother is alive. He indicated that his father is alive. He indicated that the status of his maternal grandfather is unknown. He indicated that the status of his paternal grandmother is unknown. He indicated that the status of his paternal grandfather is unknown. He indicated that the status of his neg hx is unknown.   family history includes Heart Disease in his paternal grandmother; High Cholesterol in his father; Other in his father; Stroke in his maternal grandfather and paternal grandfather. SOCIAL HISTORY      reports that he has never smoked. He has never used smokeless tobacco. He reports current alcohol use. He reports that he does not use drugs. PHYSICAL EXAM     INITIAL VITALS:  weight is 170 lb (77.1 kg). His oral temperature is 98 °F (36.7 °C). His blood pressure is 127/77 and his pulse is 51. His respiration is 15 and oxygen saturation is 97%.     Physical Exam  Vitals signs by myself. Scribe:  Terry Israel 2/20/20 8:14 AM Scribing for and in the presence of Cris Santiago Pa-C. Signed by: Tyrone Zeng, 02/20/20 1:33 PM    Provider:  I personally performed the services described in the documentation, reviewed and edited the documentation which was dictated to the scribe in my presence, and it accurately records my words and actions.     Cris Santiago PA-C 2/20/20 1:33 PM          Kevon Goldmann, PA  02/20/20 [id number]

## 2020-03-05 ENCOUNTER — OFFICE VISIT (OUTPATIENT)
Dept: CARDIOLOGY CLINIC | Age: 40
End: 2020-03-05
Payer: COMMERCIAL

## 2020-03-05 VITALS
HEIGHT: 70 IN | SYSTOLIC BLOOD PRESSURE: 124 MMHG | BODY MASS INDEX: 25.14 KG/M2 | HEART RATE: 73 BPM | DIASTOLIC BLOOD PRESSURE: 80 MMHG | WEIGHT: 175.6 LBS

## 2020-03-05 PROCEDURE — 93000 ELECTROCARDIOGRAM COMPLETE: CPT | Performed by: INTERNAL MEDICINE

## 2020-03-05 PROCEDURE — 99212 OFFICE O/P EST SF 10 MIN: CPT | Performed by: INTERNAL MEDICINE

## 2020-03-05 PROCEDURE — 1036F TOBACCO NON-USER: CPT | Performed by: INTERNAL MEDICINE

## 2020-03-05 PROCEDURE — G8427 DOCREV CUR MEDS BY ELIG CLIN: HCPCS | Performed by: INTERNAL MEDICINE

## 2020-03-05 PROCEDURE — G8484 FLU IMMUNIZE NO ADMIN: HCPCS | Performed by: INTERNAL MEDICINE

## 2020-03-05 PROCEDURE — G8419 CALC BMI OUT NRM PARAM NOF/U: HCPCS | Performed by: INTERNAL MEDICINE

## 2020-03-05 NOTE — PROGRESS NOTES
100 [facility],[place] [address] [address] 903 [facility] [address]  Dept: [phone]  Dept Fax: [phone]  Loc: [phone]    Visit Date: 3/5/2020    Mr. Shanika Alcazar is a 37 y.o. male  who presented for:    Post heart catheterization   Nonobstructive CAD    HPI:   MILTON Umanzor is a pleasant 37year old male patient who  has a past medical history of AR (allergic rhinitis), Hypercholesteremia, and Hypertension. The patient was previously evaluated for angina equivalent symptoms, abnormal coronary CT scan. 5 [facility] [place] [facility] [place]/2020 revealed nonobstructive CAD for which medical management was recommended. Recently diagnosed with DEANDRA. The patient will start CPAP. Patient denies chest pain, shortness of breath, dyspnea on exertion, orthopnea, paroxysmal nocturnal dyspnea, palpitations, dizziness, syncope, weight gain or leg swelling. Current Outpatient Medications:     aspirin 81 MG tablet, Take 81 mg by mouth daily, Disp: , Rfl:     Coenzyme Q10 (CO Q 10) 10 MG CAPS, Take by mouth daily , Disp: , Rfl:     ondansetron (ZOFRAN ODT) 4 MG disintegrating tablet, Take 1 tablet by mouth every 8 hours as needed for Nausea, Disp: 20 tablet, Rfl: 0    losartan (COZAAR) 100 MG tablet, Take 100 mg by mouth nightly , Disp: , Rfl:     ibuprofen (ADVIL;MOTRIN) 200 MG tablet, Take 200 mg by mouth as needed for Pain., Disp: , Rfl:     Cholecalciferol (VITAMIN D-3 PO), Take  by mouth daily. 1-2 tabs, Disp: , Rfl:     simvastatin (ZOCOR) 20 MG tablet, Take 20 mg by mouth nightly.  , Disp: , Rfl:     fexofenadine (ALLEGRA) 180 MG tablet, Take 180 mg by mouth daily as needed. , Disp: , Rfl:     fluticasone (FLONASE) 50 MCG/ACT nasal spray, 1 spray by Nasal route as needed. , Disp: , Rfl:     MULTIPLE VITAMIN PO, Take  by mouth daily.   , Disp: , Rfl:     Past Medical History  Donal Maurice  has a past medical history of AR (allergic rhinitis), Hypercholesteremia, and Hypertension. Social History  Dawn Thompson  reports that he has never smoked. He has never used smokeless tobacco. He reports current alcohol use. He reports that he does not use drugs. Family History  Dawn Thompson family history includes Heart Disease in his paternal grandmother; High Cholesterol in his father; Other in his father; Stroke in his maternal grandfather and paternal grandfather. \    Past Surgical History   Past Surgical History:   Procedure Laterality Date    CARDIOVASCULAR STRESS TEST      INGUINAL HERNIA REPAIR Right 6-13-14    [facility]ot assisted-[facility]    STOMACH SURGERY  1978    Pyloric Stenosis    WISDOM TOOTH EXTRACTION  1996       Review of Systems   Constitutional: Negative for chills and fever  HENT: Negative for congestion, sinus pressure, sneezing and sore throat. Eyes: Negative for pain, discharge, redness and itching. Respiratory: Negative for apnea, cough  Gastrointestinal: Negative for blood in stool, constipation, diarrhea   Endocrine: Negative for cold intolerance, heat intolerance, polydipsia. Genitourinary: Negative for dysuria, enuresis, flank pain and hematuria. Musculoskeletal: Negative for arthralgias, joint swelling and neck pain. Neurological: Negative for numbness and headaches. Psychiatric/Behavioral: Negative for agitation, confusion, decreased concentration and dysphoric mood. Objective: There were no vitals taken for this visit. Wt Readings from Last 3 Encounters:   02/20/20 170 lb (77.1 kg)   02/19/20 180 lb (81.6 kg)   02/14/20 180 lb 6.4 oz (81.8 kg)     BP Readings from Last 3 Encounters:   02/20/20 127/77   02/19/20 106/68   02/14/20 126/72       Nursing note and vitals reviewed. Physical Exam   Constitutional: Oriented to person, place, and time. Appears well-developed and well-nourished. ENT: Moist mucous membranes. No bleeding. Tongue is midline. Head: Normocephalic and atraumatic.    Eyes: EOM are normal. Pupils are equal, round, and reactive to light. Neck: Normal range of motion. Neck supple. No JVD present. Cardiovascular: Normal rate, regular rhythm, NOP murmur, no rubs, and intact distal pulses. Pulmonary/Chest: Effort normal and breath sounds normal. No respiratory distress. No wheezes. No rales. Abdominal: Soft. Bowel sounds are normal. No distension. There is no tenderness. Musculoskeletal: Normal range of motion. No edema. Neurological: Alert and oriented to person, place, and time. No cranial nerve deficit. Coordination normal.   Skin: Skin is warm and dry. Psychiatric: Normal mood and affect.        No results found for: CKTOTAL, CKMB, CKMBINDEX    Lab Results   Component Value Date    WBC 4.3 02/19/2020    RBC 4.44 02/19/2020    HGB 13.2 02/19/2020    HCT 40.9 02/19/2020    MCV 92.1 02/19/2020    MCH 29.7 02/19/2020    MCHC 32.3 02/19/2020     02/19/2020    MPV 10.6 02/19/2020       Lab Results   Component Value Date     02/19/2020    K 3.9 02/19/2020     02/19/2020    CO2 27 02/19/2020    BUN 20 02/19/2020    LABALBU 4.7 12/26/2018    CREATININE 1.0 02/19/2020    CALCIUM 9.4 02/19/2020    LABGLOM 82 02/19/2020    GLUCOSE 92 02/19/2020       Lab Results   Component Value Date    ALKPHOS 43 12/26/2018    ALT 52 12/26/2018    AST 55 12/26/2018    PROT 7.4 12/26/2018    BILITOT 0.3 12/26/2018    BILIDIR <0.2 12/26/2018    LABALBU 4.7 12/26/2018       No results found for: MG    Lab Results   Component Value Date    INR 0.98 02/19/2020         No results found for: LABA1C    Lab Results   Component Value Date    TRIG 56 02/19/2020    HDL 57 02/19/2020    LDLCALC 77 02/19/2020       No results found for: TSH      Testing Reviewed:      I have individually reviewed the cardiac test below:    ECHO:   Results for orders placed during the hospital encounter of 02/19/20   ECHO Complete 2D W Doppler W Color    Narrative Transthoracic Echocardiography Report (TTE)     Demographics      Patient Name  [facility] stenotic lesions. LPL has mild diffuse disease. 3  Left anterior descending artery. Proximal LAD has mild to moderately  calcified 30% lesion. Mid LAD is patent. No significant stenosis. Distal LAD is patent. No significant stenosis. D1 has an ostial 20%  lesion, moderate-sized vessel. 4.  Right coronary artery. Proximal RCA has 10% to 20% lesion. Mid RCA  has 30% to 40% lesion. Distal RCA is patent without significant  stenotic lesions. RCA is a dominant vessel. RPDA has mild diffuse  disease. This seems to be a codominant system.     MEDICATIONS:  See MAR.     COMPLICATIONS:  None.     ESTIMATED BLOOD LOSS:  Less than 30 mL.     ACCESS:  Right radial artery access. Vasc Band was applied. Hemostasis  was achieved.     CONCLUSION:  Nonobstructive coronary artery disease.     RECOMMENDATIONS:  1. Aggressive risk factor modification and medical management for  nonobstructive CAD. 2.  Check lipid panel. 3.  Currently on Zocor 20 mg p.o. daily, adjust based on lipid panel  findings. 4.  Aspirin 81 mg p.o. daily.     Findings and plan of care discussed with the patient and family. Questions were answered.           Sydelle Rinne, MD     D: 02/19/2020 10:55:20    AssessmentPlan:     Ace Ji is a pleasant 37year old male patient who  has a past medical history of AR (allergic rhinitis), Hypercholesteremia, and Hypertension. The patient was previously evaluated for angina equivalent symptoms, abnormal coronary CT scan. [address] [place] [address] 02/2020 revealed nonobstructive CAD for which medical management was recommended. Recently diagnosed with DEANDRA. The patient will start CPAP. Patient denies chest pain, shortness of breath, dyspnea on exertion, orthopnea, paroxysmal nocturnal dyspnea, palpitations, dizziness, syncope, weight gain or leg swelling. 1. Nonobstructive CAD  2. Cath 02/2020 (pLAD 30% Calcified lesion, RCA 40% lesion, OM 30%)  3. Dyslipidemia  4. Hypertension  5.  Recently diagnosed with

## 2020-04-24 ENCOUNTER — TELEMEDICINE (OUTPATIENT)
Dept: PULMONOLOGY | Age: 40
End: 2020-04-24
Payer: COMMERCIAL

## 2020-04-24 PROCEDURE — G8419 CALC BMI OUT NRM PARAM NOF/U: HCPCS | Performed by: PHYSICIAN ASSISTANT

## 2020-04-24 PROCEDURE — 1036F TOBACCO NON-USER: CPT | Performed by: PHYSICIAN ASSISTANT

## 2020-04-24 PROCEDURE — 99213 OFFICE O/P EST LOW 20 MIN: CPT | Performed by: PHYSICIAN ASSISTANT

## 2020-04-24 PROCEDURE — G8428 CUR MEDS NOT DOCUMENT: HCPCS | Performed by: PHYSICIAN ASSISTANT

## 2020-04-24 ASSESSMENT — ENCOUNTER SYMPTOMS
COUGH: 0
DIARRHEA: 0
ALLERGIC/IMMUNOLOGIC NEGATIVE: 1
NAUSEA: 0
WHEEZING: 0
SHORTNESS OF BREATH: 0
BACK PAIN: 0
EYES NEGATIVE: 1
STRIDOR: 0
CHEST TIGHTNESS: 0

## 2020-04-24 NOTE — PROGRESS NOTES
[facility]    : Samira Victor, 37 y.o.   : 1980    Pt of Dr. Jersey Prince     Subjective   No chief complaint on file. MILTON Pedro is here for follow up for ***. Progress History:   Since last visit any new medical issues? {EXAM; YES/NO:::\"No\"}  New ER or hospitlal visits? {EXAM; YES/NO:::\"No\"}  Any new or changes in medicines? {EXAM; YES/NO:::\"No\"}  Using inhalers? {EXAM; YES/NO:::\"No\"}  Are they helpful? {EXAM; YES/NO:::\"No\"}  Past Medical hx   PMH:  Past Medical History:   Diagnosis Date    AR (allergic rhinitis)     Hypercholesteremia     BORDERLINE    Hypertension      SURGICAL HISTORY:  Past Surgical History:   Procedure Laterality Date    CARDIOVASCULAR STRESS TEST      INGUINAL HERNIA REPAIR Right 14    [facility]ot assisted-[facility]    STOMACH SURGERY      Pyloric Stenosis    WISDOM TOOTH EXTRACTION       SOCIAL HISTORY:  Social History     Tobacco Use    Smoking status: Never Smoker    Smokeless tobacco: Never Used   Substance Use Topics    Alcohol use: Yes     Types: 4 Cans of beer per week     Comment: moderate    Drug use: No     ALLERGIES:No Known Allergies  FAMILY HISTORY:  Family History   Problem Relation Age of Onset    High Cholesterol Father     Other Father         afib    Stroke Maternal Grandfather     Heart Disease Paternal Grandmother     Stroke Paternal Grandfather     Prostate Cancer Neg Hx      CURRENT MEDICATIONS:  Current Outpatient Medications   Medication Sig Dispense Refill    aspirin 81 MG tablet Take 81 mg by mouth daily      Coenzyme Q10 (CO Q 10) 10 MG CAPS Take by mouth daily       ondansetron (ZOFRAN ODT) 4 MG disintegrating tablet Take 1 tablet by mouth every 8 hours as needed for Nausea 20 tablet 0    losartan (COZAAR) 100 MG tablet Take 100 mg by mouth nightly       ibuprofen (ADVIL;MOTRIN) 200 MG tablet Take 200 mg by mouth as needed for Pain.      

## 2020-06-01 LAB
CHOLESTEROL, TOTAL: 167 MG/DL
CHOLESTEROL/HDL RATIO: NORMAL
HDLC SERPL-MCNC: 53 MG/DL (ref 35–70)
LDL CHOLESTEROL CALCULATED: 102 MG/DL (ref 0–160)
TRIGL SERPL-MCNC: 60 MG/DL
VLDLC SERPL CALC-MCNC: NORMAL MG/DL

## 2020-12-30 ASSESSMENT — ENCOUNTER SYMPTOMS
EYES NEGATIVE: 1
BACK PAIN: 0
NAUSEA: 0
CHEST TIGHTNESS: 0
WHEEZING: 0
ALLERGIC/IMMUNOLOGIC NEGATIVE: 1
DIARRHEA: 0
SHORTNESS OF BREATH: 0
COUGH: 0
STRIDOR: 0

## 2020-12-30 NOTE — PROGRESS NOTES
[facility]      Jennifer Prather         [id number]  12/31/2020   Chief Complaint   Patient presents with    Follow-up     DEANDRA          PAP Download:   Original or initial AHI:  12.9   Date of initial study:2/11/20    Compliant  73%     Noncompliant 27 %     PAP Type APAP Level  5-20   Avg Hrs/Day 4hr 45min  AHI: 0.9     Machine/Mfg:   [x] ResMed    [] Respironics/Dreamstation   Interface:   [] Nasal    [x] Nasal pillows   [] FFM      Provider:      [x] [facility]     [][facility]     [] [employer]    [] [address]    [] [place]               [] [facility]      [] [employer]    [] [address]      [] Other    Here is a scan of the most recent download:        [place] Sleepiness Score: 6  Presentation:   Tiffanie Das presents for sleep medicine follow up for obstructive sleep apnea  Since the last visit, Tiffanie Das is doing well with PAP. He had a new baby last month and his sleep is more interrupted. He feels mostly rested. Equipment issues: The pressure is  acceptable, the mask is acceptable     Sleep issues:  Do you feel better? Yes  More rested? Yes   Better concentration? yes    Progress History:   Since last visit any new medical issues? No  New ER or hospitlal visits? No  Any new or changes in medicines? No  Any new sleep medicines?  No        Past Medical History:   Diagnosis Date    AR (allergic rhinitis)     Hypercholesteremia     BORDERLINE    Hypertension        Past Surgical History:   Procedure Laterality Date    CARDIOVASCULAR STRESS TEST      INGUINAL HERNIA REPAIR Right 6-13-14    Robot assisted-[place]    STOMACH SURGERY  1978    Pyloric Stenosis    WISDOM TOOTH EXTRACTION  1996       Social History     Tobacco Use    Smoking status: Never Smoker    Smokeless tobacco: Never Used   Substance Use Topics    Alcohol use: Yes     Types: 4 Cans of beer per week     Comment: moderate    Drug use: No       No Known Allergies    Current Outpatient Medications Medication Sig Dispense Refill    CPAP Machine MISC by Does not apply route Please change CPAP pressure to 9 cm H20. 1 each 0    aspirin 81 MG tablet Take 81 mg by mouth daily      Coenzyme Q10 (CO Q 10) 10 MG CAPS Take by mouth daily       ondansetron (ZOFRAN ODT) 4 MG disintegrating tablet Take 1 tablet by mouth every 8 hours as needed for Nausea 20 tablet 0    losartan (COZAAR) 100 MG tablet Take 100 mg by mouth nightly       ibuprofen (ADVIL;MOTRIN) 200 MG tablet Take 200 mg by mouth as needed for Pain.  Cholecalciferol (VITAMIN D-3 PO) Take  by mouth daily. 1-2 tabs      simvastatin (ZOCOR) 20 MG tablet Take 20 mg by mouth nightly.  fexofenadine (ALLEGRA) 180 MG tablet Take 180 mg by mouth daily as needed.  fluticasone (FLONASE) 50 MCG/ACT nasal spray 1 spray by Nasal route as needed.  MULTIPLE VITAMIN PO Take  by mouth daily. No current facility-administered medications for this visit. Family History   Problem Relation Age of Onset    High Cholesterol Father     Other Father         afib    Stroke Maternal Grandfather     Heart Disease Paternal Grandmother     Stroke Paternal Grandfather     Prostate Cancer Neg Hx         Review of Systems -   Review of Systems   Constitutional: Negative for activity change, appetite change, chills and fever. HENT: Negative for congestion and postnasal drip. Eyes: Negative. Respiratory: Negative for cough, chest tightness, shortness of breath, wheezing and stridor. Cardiovascular: Negative for chest pain and leg swelling. Gastrointestinal: Negative for diarrhea and nausea. Endocrine: Negative. Genitourinary: Negative. Musculoskeletal: Negative. Negative for arthralgias and back pain. Skin: Negative. Allergic/Immunologic: Negative. Neurological: Negative. Negative for dizziness and light-headedness. Psychiatric/Behavioral: Negative. All other systems reviewed and are negative. - He was instructed on weight loss  - Geno Irby was educated about my impression and plan. Patient verbalizesunderstanding. We will see Fazal Chaudhary back in: 1 year with download    Information added by my medical assistant/LPN was reviewed today        ARI Bender  12/31/2020        Geno Irby is being evaluated by a Virtual Visit (video visit) encounter to address concerns as mentioned above. A caregiver was present when appropriate. Due to this being a TeleHealth encounter ([facility]), evaluation of the following organ systems was limited: Vitals/Constitutional/EENT/Resp/CV/GI//MS/Neuro/Skin/Heme-Lymph-Imm. Pursuant to the emergency declaration under the [address] [place] [address] and the [employer] Act, this Virtual Visit was conducted with patient's (and/or legal guardian's) consent, to reduce the patient's risk of exposure to COVID-19 and provide necessary medical care. The patient (and/or legal guardian) has also been advised to contact this office for worsening conditions or problems, and seek emergency medical treatment and/or call 911 if deemed necessary. Services were provided through a video synchronous discussion virtually to substitute for in-person clinic visit. Patient and provider were located at their individual homes. Patient identification was verified at the start of the visit. Total time spent on this encounter was 20 min     ARI Feldman PA-C  12/31/2020      An electronic signature was used to authenticate this note.

## 2020-12-31 ENCOUNTER — TELEMEDICINE (OUTPATIENT)
Dept: PULMONOLOGY | Age: 40
End: 2020-12-31
Payer: COMMERCIAL

## 2020-12-31 PROCEDURE — 99213 OFFICE O/P EST LOW 20 MIN: CPT | Performed by: PHYSICIAN ASSISTANT

## 2021-03-14 NOTE — PROGRESS NOTES
[address] [facility] [address] [place] [address]  Dept: [phone]  Dept Fax: [phone]  Loc: [phone]    Visit Date: 3/16/2021    Mr. Fabrice Jaffe is a 41 y.o. male  who presented for:    Follow up  Nonobstructive CAD    HPI:   MILTON Lyons is a pleasant 41year old male patient who  has a past medical history of AR (allergic rhinitis), Hypercholesteremia, and Hypertension. He also has h/o DEANDRA. The patient was previously evaluated for angina equivalent symptoms, abnormal coronary CT scan. [facility] 02/2020 revealed nonobstructive CAD for which medical management was recommended. Echo 02/2020 revealed 55-60%, PFO, normal RV function, mild MR, Mild TR, Mild pulmonary hypertension. The patient feels well. Patient denies chest pain, shortness of breath, dyspnea on exertion, orthopnea, paroxysmal nocturnal dyspnea, palpitations, dizziness, syncope, weight gain or leg swelling. Current Outpatient Medications:     CPAP Machine MISC, by Does not apply route Please change CPAP pressure to 9 cm H20., Disp: 1 each, Rfl: 0    aspirin 81 MG tablet, Take 81 mg by mouth daily, Disp: , Rfl:     Coenzyme Q10 (CO Q 10) 10 MG CAPS, Take by mouth daily , Disp: , Rfl:     ondansetron (ZOFRAN ODT) 4 MG disintegrating tablet, Take 1 tablet by mouth every 8 hours as needed for Nausea, Disp: 20 tablet, Rfl: 0    losartan (COZAAR) 100 MG tablet, Take 100 mg by mouth nightly , Disp: , Rfl:     ibuprofen (ADVIL;MOTRIN) 200 MG tablet, Take 200 mg by mouth as needed for Pain., Disp: , Rfl:     Cholecalciferol (VITAMIN D-3 PO), Take  by mouth daily. 1-2 tabs, Disp: , Rfl:     simvastatin (ZOCOR) 20 MG tablet, Take 20 mg by mouth nightly.  , Disp: , Rfl:     fexofenadine (ALLEGRA) 180 MG tablet, Take 180 mg by mouth daily as needed. , Disp: , Rfl:     fluticasone (FLONASE) 50 MCG/ACT nasal spray, 1 spray by Nasal route as needed. , Disp: , Rfl:    MULTIPLE VITAMIN PO, Take  by mouth daily. , Disp: , Rfl:     Past Medical History  Renita Long  has a past medical history of AR (allergic rhinitis), Hypercholesteremia, and Hypertension. Social History  Renita Long  reports that he has never smoked. He has never used smokeless tobacco. He reports current alcohol use. He reports that he does not use drugs. Family History  Renita Long family history includes Heart Disease in his paternal grandmother; High Cholesterol in his father; Other in his father; Stroke in his maternal grandfather and paternal grandfather. Past Surgical History   Past Surgical History:   Procedure Laterality Date    CARDIOVASCULAR STRESS TEST      INGUINAL HERNIA REPAIR Right 6-13-14    [facility] assisted[facility]    STOMACH SURGERY  1978    Pyloric Stenosis    WISDOM TOOTH EXTRACTION  1996       Review of Systems   Constitutional: Negative for chills and fever  HENT: Negative for congestion, sinus pressure, sneezing and sore throat. Eyes: Negative for pain, discharge, redness and itching. Respiratory: Negative for apnea, cough  Gastrointestinal: Negative for blood in stool, constipation, diarrhea   Endocrine: Negative for cold intolerance, heat intolerance, polydipsia. Genitourinary: Negative for dysuria, enuresis, flank pain and hematuria. Musculoskeletal: Negative for arthralgias, joint swelling and neck pain. Neurological: Negative for numbness and headaches. Psychiatric/Behavioral: Negative for agitation, confusion, decreased concentration and dysphoric mood. Objective: There were no vitals taken for this visit. Wt Readings from Last 3 Encounters:   03/05/20 175 lb 9.6 oz (79.7 kg)   02/20/20 170 lb (77.1 kg)   02/19/20 180 lb (81.6 kg)     BP Readings from Last 3 Encounters:   03/05/20 124/80   02/20/20 127/77   02/19/20 106/68       Nursing note and vitals reviewed. Physical Exam   Constitutional: Oriented to person, place, and time.  Appears well-developed and well-nourished. ENT: Moist mucous membranes. No bleeding. Tongue is midline. Head: Normocephalic and atraumatic. Eyes: EOM are normal. Pupils are equal, round, and reactive to light. Neck: Normal range of motion. Neck supple. No JVD present. Cardiovascular: Normal rate, regular rhythm, no murmur, no rubs, and intact distal pulses. Pulmonary/Chest: Effort normal and breath sounds normal. No respiratory distress. No wheezes. No rales. Abdominal: Soft. Bowel sounds are normal. No distension. There is no tenderness. Musculoskeletal: Normal range of motion. no edema. Neurological: Alert and oriented to person, place, and time. No cranial nerve deficit. Coordination normal.   Skin: Skin is warm and dry. Psychiatric: Normal mood and affect.        No results found for: CKTOTAL, CKMB, CKMBINDEX    Lab Results   Component Value Date    WBC 4.3 02/19/2020    RBC 4.44 02/19/2020    HGB 13.2 02/19/2020    HCT 40.9 02/19/2020    MCV 92.1 02/19/2020    MCH 29.7 02/19/2020    MCHC 32.3 02/19/2020     02/19/2020    MPV 10.6 02/19/2020       Lab Results   Component Value Date     02/19/2020    K 3.9 02/19/2020     02/19/2020    CO2 27 02/19/2020    BUN 20 02/19/2020    LABALBU 4.7 12/26/2018    CREATININE 1.0 02/19/2020    CALCIUM 9.4 02/19/2020    LABGLOM 82 02/19/2020    GLUCOSE 92 02/19/2020       Lab Results   Component Value Date    ALKPHOS 43 12/26/2018    ALT 52 12/26/2018    AST 55 12/26/2018    PROT 7.4 12/26/2018    BILITOT 0.3 12/26/2018    BILIDIR <0.2 12/26/2018    LABALBU 4.7 12/26/2018       No results found for: MG    Lab Results   Component Value Date    INR 0.98 02/19/2020         No results found for: LABA1C    Lab Results   Component Value Date    TRIG 60 06/01/2020    HDL 53 06/01/2020    LDLCALC 102 06/01/2020       No results found for: TSH      Testing Reviewed:      I have individually reviewed the cardiac test below:    ECHO:   Results for orders placed during the hospital encounter of 02/19/20   ECHO Complete 2D W Doppler W Color    Narrative Transthoracic Echocardiography Report (TTE)     Demographics      Patient Name  [facility]       Gender             Male                 Imani Hess      MR #          [id number]      Race                                                  Ethnicity      Account #     [de-identified]      Room Number        0004      Accession     [id number]      Date of Study      02/19/2020   Number      Date of Birth 1980     Referring          Kong Bartlett MD                                Physician          Vaibhav Zaidi MD      Age           37 year(s)     Sonographer        ASIM Milton, PRINCESSCS,                                                   RDMS, RVT                                   Interpreting       Vaibhav Zaidi MD                                Physician     Procedure    Type of Study      TTE procedure:ECHOCARDIOGRAM COMPLETE 2D W DOPPLER W COLOR. Procedure Date  Date: 02/19/2020 Start: 07:23 AM    Study Location: [facility]  Technical Quality: Adequate visualization    Indications:Shortness of breath. Additional Medical History:hyperlipidemia, hypertension, family history of  coronary artery disease, dyspnea on exertion    Patient Status: Routine    Height: 70 inches Weight: 175 pounds BSA: 1.97 m^2 BMI: 25.11 kg/m^2    BP: 131/77 mmHg    Allergies    - No Known Allergies. Conclusions      Summary   Normal left ventricle size and systolic function. Ejection fraction was   estimated at 55-60%. There were no regional left ventricular wall motion   abnormalities and wall thickness was within normal limits. The right atrium is of normal size. Possible PFO/ASD visualized. Further interrogation recommended if   clinically indicated. Left atrial size was normal.   The right ventricular size was normal with normal systolic function and   wall thickness. Mild mitral regurgitation is present. Mild tricuspid regurgitation. Evidence of pericardial thickening/calcification present. No evidence of any pericardial effusion. RVSP 5-10 mmHg. Estimated pulmonary artery systolic pressure is 36-25 mmhg, consistent   with mild pulmonary hypertension. Signature      ----------------------------------------------------------------   Electronically signed by Vaibhav Zaidi MD (Interpreting   physician) on 02/19/2020 at 01:13 PM   ----------------------------------------------------------------      Findings      Mitral Valve   Structurally normal mitral valve. Mild mitral regurgitation is present. Aortic Valve   The aortic valve was trileaflet with normal thickness and cuspal   separation. DOPPLER: Transaortic velocity was within the normal range with   no evidence of aortic stenosis. There was evidence of Trivial aortic   regurgitation. Tricuspid Valve   Tricuspid valve is structurally normal.   Mild tricuspid regurgitation. Pulmonic Valve   The pulmonic valve leaflets exhibited normal thickness, no calcification,   and normal cuspal separation. DOPPLER: The transpulmonic velocity was   within the normal range with no evidence for regurgitation. Left Atrium   Left atrial size was normal.      Left Ventricle   Normal left ventricle size and systolic function. Ejection fraction was   estimated at 55-60%. There were no regional left ventricular wall motion   abnormalities and wall thickness was within normal limits. Right Atrium   The right atrium is of normal size. Possible PFO/ASD visualized. Further interrogation recommended if   clinically indicated. Right Ventricle   The right ventricular size was normal with normal systolic function and   wall thickness. Pericardial Effusion   Evidence of pericardial thickening/calcification present. No evidence of any pericardial effusion. Pleural Effusion   No evidence of pleural effusion. Aorta / Great Vessels   The aorta is within normal limits. bifurcates into LCX and LAD. 2  Left circumflex artery.  Proximal LCX is patent.  OM1 is relatively  large branching vessel that has 30% lesion.  Distal LCX is patent  without significant stenotic lesions.  LPL has mild diffuse disease. 3  Left anterior descending artery.  Proximal LAD has mild to moderately  calcified 30% lesion.  Mid LAD is patent.  No significant stenosis. Distal LAD is patent.  No significant stenosis.  D1 has an ostial 20%  lesion, moderate-sized vessel. 4.  Right coronary artery.  Proximal RCA has 10% to 20% lesion.  Mid RCA  has 30% to 40% lesion.  Distal RCA is patent without significant  stenotic lesions. Penny Hunger is a dominant vessel.  RPDA has mild diffuse  disease.  This seems to be a codominant system.     MEDICATIONS:  See MAR.     COMPLICATIONS:  None.     ESTIMATED BLOOD LOSS:  Less than 30 mL.     ACCESS:  Right radial artery access.  Vasc Band was applied.  Hemostasis  was achieved.     CONCLUSION:  Nonobstructive coronary artery disease.     RECOMMENDATIONS:  1.  Aggressive risk factor modification and medical management for  nonobstructive CAD. 2.  Check lipid panel. 3.  Currently on Zocor 20 mg p.o. daily, adjust based on lipid panel  findings. 4.  Aspirin 81 mg p.o. daily.     Findings and plan of care discussed with the patient and family. Questions were answered.           Supriya Monae MD     D: 02/19/2020 10:55:20    AssessmentPlan:   Leon Ohara is a pleasant 41year old male patient who  has a past medical history of AR (allergic rhinitis), Hypercholesteremia, and Hypertension. He also has h/o DEANDRA. The patient was previously evaluated for angina equivalent symptoms, abnormal coronary CT scan. [address] [place] [address] 02/2020 revealed nonobstructive CAD for which medical management was recommended. Echo 02/2020 revealed 55-60%, PFO, normal RV function, mild MR, Mild TR, Mild pulmonary hypertension. The patient feels well.  Patient denies chest pain, shortness of breath, dyspnea on exertion, orthopnea, paroxysmal nocturnal dyspnea, palpitations, dizziness, syncope, weight gain or leg swelling. 1. Nonobstructive CAD  2. Cath 02/2020 (pLAD 30% Calcified lesion, RCA 40% lesion, OM 30%)  3. EF 55-60% (Echo 02/2020)  4. Mild MR/TR  5. Mild pulmonary hypertension   6. PFO  7. Dyslipidemia  8. Hypertension  9. DEANDRA     Probable PFO on Echo, ??ASD   RV function was normal on Echo 02/2020   No signs of volume overload    Seen by sleep medicine, on CPAP   Reports compliance   Denies chest pain   Has no exertional symptoms    ASA   Statin   Hyperlipidemia: on statins, followed periodically. Patient need periodic lipid and liver profile      Above findings and plan of care were discussed with patient in details, patient's questions were answered.      Disposition:  RTC in 6 months             Electronically signed by Ad Contreras MD, [facility] [place]    3/14/2021 at 2:55 PM EDT

## 2021-03-16 ENCOUNTER — OFFICE VISIT (OUTPATIENT)
Dept: CARDIOLOGY CLINIC | Age: 41
End: 2021-03-16
Payer: COMMERCIAL

## 2021-03-16 VITALS
WEIGHT: 187.6 LBS | SYSTOLIC BLOOD PRESSURE: 128 MMHG | HEIGHT: 70 IN | DIASTOLIC BLOOD PRESSURE: 80 MMHG | BODY MASS INDEX: 26.86 KG/M2 | HEART RATE: 61 BPM

## 2021-03-16 DIAGNOSIS — R06.09 DOE (DYSPNEA ON EXERTION): ICD-10-CM

## 2021-03-16 DIAGNOSIS — I20.8 OTHER FORMS OF ANGINA PECTORIS (HCC): Primary | ICD-10-CM

## 2021-03-16 PROCEDURE — 1036F TOBACCO NON-USER: CPT | Performed by: INTERNAL MEDICINE

## 2021-03-16 PROCEDURE — G8484 FLU IMMUNIZE NO ADMIN: HCPCS | Performed by: INTERNAL MEDICINE

## 2021-03-16 PROCEDURE — 93000 ELECTROCARDIOGRAM COMPLETE: CPT | Performed by: INTERNAL MEDICINE

## 2021-03-16 PROCEDURE — 99213 OFFICE O/P EST LOW 20 MIN: CPT | Performed by: INTERNAL MEDICINE

## 2021-03-16 PROCEDURE — G8419 CALC BMI OUT NRM PARAM NOF/U: HCPCS | Performed by: INTERNAL MEDICINE

## 2021-03-16 PROCEDURE — G8427 DOCREV CUR MEDS BY ELIG CLIN: HCPCS | Performed by: INTERNAL MEDICINE

## 2021-06-15 PROBLEM — J30.2 SEASONAL ALLERGIES: Status: ACTIVE | Noted: 2021-06-15

## 2021-06-15 PROBLEM — E78.5 HYPERLIPIDEMIA: Status: ACTIVE | Noted: 2021-06-15

## 2021-06-15 PROBLEM — I10 ESSENTIAL HYPERTENSION: Status: ACTIVE | Noted: 2021-06-15

## 2021-09-21 ENCOUNTER — OFFICE VISIT (OUTPATIENT)
Dept: CARDIOLOGY CLINIC | Age: 41
End: 2021-09-21
Payer: COMMERCIAL

## 2021-09-21 VITALS
SYSTOLIC BLOOD PRESSURE: 123 MMHG | BODY MASS INDEX: 25.48 KG/M2 | WEIGHT: 178 LBS | HEIGHT: 70 IN | HEART RATE: 66 BPM | DIASTOLIC BLOOD PRESSURE: 83 MMHG

## 2021-09-21 DIAGNOSIS — M79.89 LEG SWELLING: ICD-10-CM

## 2021-09-21 DIAGNOSIS — I50.32 CHRONIC HEART FAILURE WITH PRESERVED EJECTION FRACTION (HFPEF) (HCC): Primary | ICD-10-CM

## 2021-09-21 PROCEDURE — G8419 CALC BMI OUT NRM PARAM NOF/U: HCPCS | Performed by: INTERNAL MEDICINE

## 2021-09-21 PROCEDURE — G8427 DOCREV CUR MEDS BY ELIG CLIN: HCPCS | Performed by: INTERNAL MEDICINE

## 2021-09-21 PROCEDURE — 99214 OFFICE O/P EST MOD 30 MIN: CPT | Performed by: INTERNAL MEDICINE

## 2021-09-21 PROCEDURE — 1036F TOBACCO NON-USER: CPT | Performed by: INTERNAL MEDICINE

## 2021-09-21 NOTE — PROGRESS NOTES
[address] [facility] [address]  Dept: [phone]  Dept Fax: [phone]  Loc: [phone]    Visit Date: 9/21/2021    Mr. James Baum is a 35 y.o. male  who presented for:    Nonobstructive CAD  HPI:   MILTON Prather is a pleasant 35year old male patient who  has a past medical history of AR (allergic rhinitis), Hypercholesteremia, and Hypertension. He also has h/o DEANDRA. The patient was previously evaluated for angina equivalent symptoms, abnormal coronary CT scan. [facility] 02/2020 revealed nonobstructive CAD for which medical management was recommended. Echo 02/2020 revealed 55-60%, PFO, normal RV function, mild MR, Mild TR, Mild pulmonary hypertension. The patient reports being compliant with his medications. Home BP readings ok. Patient denies chest pain, shortness of breath, dyspnea on exertion, orthopnea, paroxysmal nocturnal dyspnea, palpitations, dizziness, syncope, weight gain or leg swelling. /83. Current Outpatient Medications:     losartan (COZAAR) 100 MG tablet, Take 1 tablet by mouth nightly, Disp: 90 tablet, Rfl: 3    simvastatin (ZOCOR) 20 MG tablet, Take 1 tablet by mouth nightly, Disp: 90 tablet, Rfl: 3    losartan (COZAAR) 100 MG tablet, Take 1 tablet by mouth daily, Disp: 30 tablet, Rfl: 0    CPAP Machine MISC, by Does not apply route Please change CPAP pressure to 9 cm H20., Disp: 1 each, Rfl: 0    aspirin 81 MG tablet, Take 81 mg by mouth daily, Disp: , Rfl:     Coenzyme Q10 (CO Q 10) 10 MG CAPS, Take by mouth daily , Disp: , Rfl:     ibuprofen (ADVIL;MOTRIN) 200 MG tablet, Take 200 mg by mouth as needed for Pain., Disp: , Rfl:     Cholecalciferol (VITAMIN D-3 PO), Take  by mouth daily. 1-2 tabs, Disp: , Rfl:     fexofenadine (ALLEGRA) 180 MG tablet, Take 180 mg by mouth daily as needed. , Disp: , Rfl:     fluticasone (FLONASE) 50 MCG/ACT nasal spray, 1 spray by Nasal route as needed. , Disp: , Rfl:     MULTIPLE VITAMIN PO, Take  by mouth daily. , Disp: , Rfl:     Past Medical History  Geno Irby  has a past medical history of AR (allergic rhinitis), Hypercholesteremia, and Hypertension. Social History  Geno Irby  reports that he has never smoked. He has never used smokeless tobacco. He reports current alcohol use. He reports that he does not use drugs. Family History  Geno Irby family history includes Heart Disease in his paternal grandmother; High Cholesterol in his father; Other in his father; Stroke in his maternal grandfather and paternal grandfather. Past Surgical History   Past Surgical History:   Procedure Laterality Date    CARDIOVASCULAR STRESS TEST      INGUINAL HERNIA REPAIR Right 6-13-14    [facility]    STOMACH SURGERY  1978    Pyloric Stenosis    WISDOM TOOTH EXTRACTION  1996       Review of Systems   Constitutional: Negative for chills and fever  HENT: Negative for congestion, sinus pressure, sneezing and sore throat. Eyes: Negative for pain, discharge, redness and itching. Respiratory: Negative for apnea, cough  Gastrointestinal: Negative for blood in stool, constipation, diarrhea   Endocrine: Negative for cold intolerance, heat intolerance, polydipsia. Genitourinary: Negative for dysuria, enuresis, flank pain and hematuria. Musculoskeletal: Negative for arthralgias, joint swelling and neck pain. Neurological: Negative for numbness and headaches. Psychiatric/Behavioral: Negative for agitation, confusion, decreased concentration and dysphoric mood. Objective: There were no vitals taken for this visit. Wt Readings from Last 3 Encounters:   06/15/21 180 lb (81.6 kg)   03/16/21 187 lb 9.6 oz (85.1 kg)   03/05/20 175 lb 9.6 oz (79.7 kg)     BP Readings from Last 3 Encounters:   06/15/21 124/76   05/14/21 132/80   03/16/21 128/80       Nursing note and vitals reviewed.     Physical Exam   Constitutional: Oriented to person, place, and time. Appears well-developed and well-nourished. ENT: Moist mucous membranes. No bleeding. Tongue is midline. Head: Normocephalic and atraumatic. Eyes: EOM are normal. Pupils are equal, round, and reactive to light. Neck: Normal range of motion. Neck supple. No JVD present. Cardiovascular: Normal rate, regular rhythm, murmur, no rubs, and intact distal pulses. Pulmonary/Chest: Effort normal and breath sounds normal. No respiratory distress. No wheezes. No rales. Abdominal: Soft. Bowel sounds are normal. No distension. There is no tenderness. Musculoskeletal: Normal range of motion. No edema. Neurological: Alert and oriented to person, place, and time. No cranial nerve deficit. Coordination normal.   Skin: Skin is warm and dry. Psychiatric: Normal mood and affect.        No results found for: CKTOTAL, CKMB, CKMBINDEX    Lab Results   Component Value Date    WBC 4.3 02/19/2020    RBC 4.44 02/19/2020    HGB 13.2 02/19/2020    HCT 40.9 02/19/2020    MCV 92.1 02/19/2020    MCH 29.7 02/19/2020    MCHC 32.3 02/19/2020     02/19/2020    MPV 10.6 02/19/2020       Lab Results   Component Value Date     02/19/2020    K 3.9 02/19/2020     02/19/2020    CO2 27 02/19/2020    BUN 20 02/19/2020    LABALBU 4.7 12/26/2018    CREATININE 1.0 02/19/2020    CALCIUM 9.4 02/19/2020    LABGLOM 82 02/19/2020    GLUCOSE 92 02/19/2020       Lab Results   Component Value Date    ALKPHOS 43 12/26/2018    ALT 52 12/26/2018    AST 55 12/26/2018    PROT 7.4 12/26/2018    BILITOT 0.3 12/26/2018    BILIDIR <0.2 12/26/2018    LABALBU 4.7 12/26/2018       No results found for: MG    Lab Results   Component Value Date    INR 0.98 02/19/2020         No results found for: LABA1C    Lab Results   Component Value Date    TRIG 60 06/01/2020    HDL 53 06/01/2020    LDLCALC 102 06/01/2020       No results found for: TSH      Testing Reviewed:      I have individually reviewed the cardiac test below:    ECHO: Results for orders placed during the hospital encounter of 02/19/20    ECHO Complete 2D W Doppler W Color    Narrative  Transthoracic Echocardiography Report (TTE)    Demographics    Patient Name  [facility]       Gender             Male  Mariajose Sosa    MR #          [id number]      Race                   Ethnicity    Account #     [de-identified]      Room Number        0004    Accession     [id number]      Date of Study      02/19/2020  Number    Date of Birth 1980     Referring          Kurt Jarvis MD  Physician          Florence Noel MD    Age           37 year(s)     Sonographer        D. Lazarus Myrtle, RDCS,  RDMS, RVT    Interpreting       Florence Noel MD  Physician    Procedure    Type of Study    TTE procedure:ECHOCARDIOGRAM COMPLETE 2D W DOPPLER W COLOR. Procedure Date  Date: 02/19/2020 Start: 07:23 AM    Study Location: [facility]  Technical Quality: Adequate visualization    Indications:Shortness of breath. Additional Medical History:hyperlipidemia, hypertension, family history of  coronary artery disease, dyspnea on exertion    Patient Status: Routine    Height: 70 inches Weight: 175 pounds BSA: 1.97 m^2 BMI: 25.11 kg/m^2    BP: 131/77 mmHg    Allergies  - No Known Allergies. Conclusions    Summary  Normal left ventricle size and systolic function. Ejection fraction was  estimated at 55-60%. There were no regional left ventricular wall motion  abnormalities and wall thickness was within normal limits. The right atrium is of normal size. Possible PFO/ASD visualized. Further interrogation recommended if  clinically indicated. Left atrial size was normal.  The right ventricular size was normal with normal systolic function and  wall thickness. Mild mitral regurgitation is present. Mild tricuspid regurgitation. Evidence of pericardial thickening/calcification present. No evidence of any pericardial effusion. RVSP 5-10 mmHg.   Estimated pulmonary artery systolic pressure is 61-11 mmhg, consistent  with mild pulmonary hypertension. Signature    ----------------------------------------------------------------  Electronically signed by Lester Soares MD (Interpreting  physician) on 02/19/2020 at 01:13 PM  ----------------------------------------------------------------    Findings    Mitral Valve  Structurally normal mitral valve. Mild mitral regurgitation is present. Aortic Valve  The aortic valve was trileaflet with normal thickness and cuspal  separation. DOPPLER: Transaortic velocity was within the normal range with  no evidence of aortic stenosis. There was evidence of Trivial aortic  regurgitation. Tricuspid Valve  Tricuspid valve is structurally normal.  Mild tricuspid regurgitation. Pulmonic Valve  The pulmonic valve leaflets exhibited normal thickness, no calcification,  and normal cuspal separation. DOPPLER: The transpulmonic velocity was  within the normal range with no evidence for regurgitation. Left Atrium  Left atrial size was normal.    Left Ventricle  Normal left ventricle size and systolic function. Ejection fraction was  estimated at 55-60%. There were no regional left ventricular wall motion  abnormalities and wall thickness was within normal limits. Right Atrium  The right atrium is of normal size. Possible PFO/ASD visualized. Further interrogation recommended if  clinically indicated. Right Ventricle  The right ventricular size was normal with normal systolic function and  wall thickness. Pericardial Effusion  Evidence of pericardial thickening/calcification present. No evidence of any pericardial effusion. Pleural Effusion  No evidence of pleural effusion. Aorta / Great Vessels  The aorta is within normal limits. The IVC is mildly dilated . RVSP 5-10 mmHg. Estimated pulmonary artery systolic pressure is 10-87 mmhg, consistent  with mild pulmonary hypertension.     M-Mode/2D Measurements & Calculations    LV Diastolic    LV Systolic Dimension:    AV Cusp Separation: 2.4 cmLA  Dimension: 4.9  3.5 cm                    Dimension: 3.3 cmAO Root  cm              LV Volume Diastolic: 880  Dimension: 2.8 cmLA Area: 17  LV FS:28.6 %    ml                        cm^2  LV PW           LV Volume Systolic: 24.6  Diastolic: 0.9  ml  cm              LV EDV/LV EDV Index: 113  Septum          ml/57 m^2LV ESV/LV ESV    RV Diastolic Dimension: 2.3 cm  Diastolic: 0.8  Index: 39.5 ml/26 m^2  cm              EF Calculated: 55 %       LA/Aorta: 1.18  Ascending Aorta: 3 cm  LA volume/Index: 39.3 ml /20m^2    Doppler Measurements & Calculations    MV Peak E-Wave: 75.3 cm/s  AV Peak Velocity: 158 LVOT Peak Velocity: 97.3  MV Peak A-Wave: 36.1 cm/s  cm/s                  cm/s  MV E/A Ratio: 2.09         AV Peak Gradient:     LVOT Peak Gradient: 4  MV Peak Gradient: 2.27     9.99 mmHg             mmHg  mmHg  TV Peak E-Wave: 50.1 cm/s  MV Deceleration Time: 222                        TV Peak A-Wave: 33.8 cm/s  msec  IVRT: 134 msec        TV Peak Gradient: 1 mmHg  TR Velocity:308 cm/s  MV E' Septal Velocity:                           TR Gradient:37.95 mmHg  11.5 cm/s                  AV DVI (Vmax):0.62    PV Peak Velocity: 58.7  MV A' Septal Velocity: 7.5                       cm/s  cm/s                                             PV Peak Gradient: 1.38  MV E' Lateral Velocity:                          mmHg  19.3 cm/s  MV A' Lateral Velocity:  6.8 cm/s  E/E' septal: 6.55  E/E' lateral: 3.9    [id number]       Cath:02/2020  FINDINGS:     HEMODYNAMICS:  LVEDP 60 mmHg.     LEFT VENTRICULOGRAPHY:  No regional wall motion abnormality noted. Ejection fraction estimated at 60%.     CORONARY ANGIOGRAM:  1.  Left main coronary artery.  Left main coronary artery is patent. There is mild diffuse disease in the distal part of left main.  Left  main bifurcates into LCX and LAD.   2  Left circumflex artery.  Proximal LCX is patent.  OM1 is relatively  large branching vessel that has 30% lesion.  Distal LCX is patent  without significant stenotic lesions.  LPL has mild diffuse disease. 3  Left anterior descending artery.  Proximal LAD has mild to moderately  calcified 30% lesion.  Mid LAD is patent.  No significant stenosis. Distal LAD is patent.  No significant stenosis.  D1 has an ostial 20%  lesion, moderate-sized vessel. 4.  Right coronary artery.  Proximal RCA has 10% to 20% lesion.  Mid RCA  has 30% to 40% lesion.  Distal RCA is patent without significant  stenotic lesions. [place] is a dominant vessel.  RPDA has mild diffuse  disease.  This seems to be a codominant system.       AssessmentPlan:   Get Diaz is a pleasant 35year old male patient who  has a past medical history of AR (allergic rhinitis), Hypercholesteremia, and Hypertension. He also has h/o DEANDRA. The patient was previously evaluated for angina equivalent symptoms, abnormal coronary CT scan. [address] [place] [address] 02/2020 revealed nonobstructive CAD for which medical management was recommended. Echo 02/2020 revealed 55-60%, PFO, normal RV function, mild MR, Mild TR, Mild pulmonary hypertension. The patient reports being compliant with his medications. Home BP readings ok. Patient denies chest pain, shortness of breath, dyspnea on exertion, orthopnea, paroxysmal nocturnal dyspnea, palpitations, dizziness, syncope, weight gain or leg swelling. /83. 1. Nonobstructive CAD  2. Cath 02/2020 (pLAD 30% Calcified lesion, RCA 40% lesion, OM 30%)  3. EF 55-60% (Echo 02/2020)  4. Mild MR/TR  5. Mild pulmonary hypertension   6. PFO  7. Dyslipidemia  8. Hypertension  9. DEANDRA      Cath 02/2020 revealed nonobstructive CAD as detailed above   Aggressive medical therapy and risk factor modification for CAD is indicated    ASA   Zocor    on 06/2020    Hyperlipidemia: on statins, followed periodically.  Patient need periodic lipid and liver profile    Losartan   BP controlled     Probable PFO on Echo 02/2020, ??ASD. RV function was normal on Echo 02/2020   Reports occasional ankles swelling, none at this time    Echocardiogram       Above findings and plan of care were discussed with patient in details, patient's questions were answered.      Disposition:  RTC in 1 year             Electronically signed by Mallory Rashid MD, [facility]    9/20/2021 at 8:05 PM EDT

## 2021-09-27 ENCOUNTER — HOSPITAL ENCOUNTER (OUTPATIENT)
Dept: NON INVASIVE DIAGNOSTICS | Age: 41
Discharge: HOME | End: 2021-09-27
Payer: COMMERCIAL

## 2021-09-27 DIAGNOSIS — I50.32 CHRONIC HEART FAILURE WITH PRESERVED EJECTION FRACTION (HFPEF) (HCC): ICD-10-CM

## 2021-09-27 DIAGNOSIS — M79.89 LEG SWELLING: ICD-10-CM

## 2021-09-27 LAB
LV EF: 55 %
LVEF MODALITY: NORMAL

## 2021-09-27 PROCEDURE — 93306 TTE W/DOPPLER COMPLETE: CPT

## 2021-12-29 ENCOUNTER — OFFICE VISIT (OUTPATIENT)
Dept: PULMONOLOGY | Age: 41
End: 2021-12-29
Payer: COMMERCIAL

## 2021-12-29 VITALS
OXYGEN SATURATION: 98 % | BODY MASS INDEX: 26.66 KG/M2 | HEIGHT: 70 IN | HEART RATE: 86 BPM | TEMPERATURE: 98.8 F | WEIGHT: 186.2 LBS | DIASTOLIC BLOOD PRESSURE: 82 MMHG | SYSTOLIC BLOOD PRESSURE: 134 MMHG

## 2021-12-29 DIAGNOSIS — G47.33 OSA (OBSTRUCTIVE SLEEP APNEA): Primary | ICD-10-CM

## 2021-12-29 PROCEDURE — 1036F TOBACCO NON-USER: CPT | Performed by: PHYSICIAN ASSISTANT

## 2021-12-29 PROCEDURE — 99213 OFFICE O/P EST LOW 20 MIN: CPT | Performed by: PHYSICIAN ASSISTANT

## 2021-12-29 PROCEDURE — G8484 FLU IMMUNIZE NO ADMIN: HCPCS | Performed by: PHYSICIAN ASSISTANT

## 2021-12-29 PROCEDURE — G8427 DOCREV CUR MEDS BY ELIG CLIN: HCPCS | Performed by: PHYSICIAN ASSISTANT

## 2021-12-29 PROCEDURE — G8419 CALC BMI OUT NRM PARAM NOF/U: HCPCS | Performed by: PHYSICIAN ASSISTANT

## 2021-12-29 ASSESSMENT — ENCOUNTER SYMPTOMS
EYES NEGATIVE: 1
WHEEZING: 0
BACK PAIN: 0
DIARRHEA: 0
NAUSEA: 0
ALLERGIC/IMMUNOLOGIC NEGATIVE: 1
STRIDOR: 0
SHORTNESS OF BREATH: 0
COUGH: 0
CHEST TIGHTNESS: 0

## 2021-12-29 NOTE — PROGRESS NOTES
[facility]      Westley Reveles         [id number]  12/29/2021   Chief Complaint   Patient presents with    1 Year Follow Up     DEANDRA with download         Pt of Dr. Jeannette Del Rio    PAP Download:   Deondre Tellez or initial AHI: 12.9     Date of initial study: 2/11/20      Compliant  93%     Noncompliant 7 %     PAP Type Airsense 10 autoset Level  5/20   Avg Hrs/Day 6 hr 15 min  AHI: 0.8   Recorded compliance dates , 11/28/2021  to 12/27/2021   Machine/Mfg:   [x] ResMed    [] Respironics/Dreamstation   Interface:   [] Nasal    [x] Nasal pillows   [] FFM      Provider:      [x] [facility]     [][facility]     [] [employer]    [] [address]    [] [place]               [] [facility]      [] [employer]    [] [address]      [] Other    Neck Size: 15.5  Mallampati Mallampati 4  ESS:  7  SAQLI: 81    Here is a scan of the most recent download:            Presentation:   Laisha Fernandez presents for sleep medicine follow up for obstructive sleep apnea  Since the last visit, Laisha Fernandez is doing well with PAP. He is sleeping well and feels rested. Equipment issues: The pressure is  acceptable, the mask is acceptable     Sleep issues:  Do you feel better? Yes  More rested? Yes   Better concentration? yes    Progress History:   Since last visit any new medical issues? No  New ER or hospital visits? No  Any new or changes in medicines? No  Any new sleep medicines? No    Review of Systems -   Review of Systems   Constitutional: Negative for activity change, appetite change, chills and fever. HENT: Negative for congestion and postnasal drip. Eyes: Negative. Respiratory: Negative for cough, chest tightness, shortness of breath, wheezing and stridor. Cardiovascular: Negative for chest pain and leg swelling. Gastrointestinal: Negative for diarrhea and nausea. Endocrine: Negative. Genitourinary: Negative. Musculoskeletal: Negative. Negative for arthralgias and back pain. Skin: Negative. Allergic/Immunologic: Negative. Neurological: Negative. Negative for dizziness and light-headedness. Psychiatric/Behavioral: Negative. All other systems reviewed and are negative. Physical Exam:    BMI:  Body mass index is 26.72 kg/m². Wt Readings from Last 3 Encounters:   12/29/21 186 lb 3.2 oz (84.5 kg)   09/21/21 178 lb (80.7 kg)   06/15/21 180 lb (81.6 kg)     Weight stable / unchanged  Vitals: /82 (Site: Left Upper Arm, Position: Sitting, Cuff Size: Large Adult)   Pulse 86   Temp 98.8 °F (37.1 °C) (Temporal)   Ht 5' 10\" (1.778 m)   Wt 186 lb 3.2 oz (84.5 kg)   SpO2 98%   BMI 26.72 kg/m²       Physical Exam  Constitutional:       Appearance: He is well-developed. HENT:      Head: Normocephalic and atraumatic. Right Ear: External ear normal.      Left Ear: External ear normal.   Eyes:      Conjunctiva/sclera: Conjunctivae normal.      Pupils: Pupils are equal, round, and reactive to light. Cardiovascular:      Rate and Rhythm: Normal rate and regular rhythm. Heart sounds: Normal heart sounds. Pulmonary:      Effort: Pulmonary effort is normal.      Breath sounds: Normal breath sounds. Musculoskeletal:         General: Normal range of motion. Cervical back: Normal range of motion and neck supple. Skin:     General: Skin is warm and dry. Neurological:      Mental Status: He is alert and oriented to person, place, and time. Psychiatric:         Behavior: Behavior normal.         Thought Content: Thought content normal.         Judgment: Judgment normal.           ASSESSMENT/DIAGNOSIS     Diagnosis Orders   1. DEANDRA (obstructive sleep apnea)              Plan   Do you need any equipment today? Yes update supplies  - Will change setting to 6-11- occ pressure gets too high  - Download reviewed and discussed with patient  - He  was advised to continue current positive airway pressure therapy with above described pressure.    - He  advised to keep good compliance with current recommended pressure to get optimal results and clinical improvement  - Recommend 7-9 hours of sleep with PAP  - He was advised to call DME company regarding supplies if needed.   -He call my office for earlier appointment if needed for worsening of sleep symptoms.   - He was instructed on weight loss  - David Chavez was educated about my impression and plan. Patient verbalizesunderstanding.   We will see Kaley Weeks back in: 1 year with download    Information added by my medical assistant/LPN was reviewed today         Lord Clayton HAYDEN, ARI  12/29/2021

## 2022-09-02 NOTE — PROGRESS NOTES
[address] [facility] [address] [facility] [address]  Dept: [phone]  Dept Fax: [phone]  Loc: [phone]    Visit Date: 9/13/2022    Mr. Gabrielle Norman is a 38 y.o. male  who presented for:  Hypertension   Nonobstructive CAD  HPI:   HPI   Kari Ormond is a pleasant 38year old male patient who  has a past medical history of AR (allergic rhinitis), Hypercholesteremia, and Hypertension. [facility] 02/2020 revealed nonobstructive CAD for which medical management was recommended. Echo 09/2021 revealed an EF of 55%. BP today is 136/92. He coaches soccer, walks on regular basis. Patient denies chest pain, shortness of breath, dyspnea on exertion, orthopnea, paroxysmal nocturnal dyspnea, palpitations, dizziness, syncope, weight gain or leg swelling. Denies exertional symptoms. Current Outpatient Medications:     simvastatin (ZOCOR) 20 MG tablet, Take 1 tablet by mouth nightly, Disp: 90 tablet, Rfl: 3    losartan (COZAAR) 100 MG tablet, Take 1 tablet by mouth nightly, Disp: 90 tablet, Rfl: 3    CPAP Machine MISC, by Does not apply route Please change CPAP pressure to 6-11 cm H20., Disp: 1 each, Rfl: 0    aspirin 81 MG tablet, Take 81 mg by mouth daily, Disp: , Rfl:     Coenzyme Q10 (CO Q 10) 10 MG CAPS, Take by mouth daily , Disp: , Rfl:     ibuprofen (ADVIL;MOTRIN) 200 MG tablet, Take 200 mg by mouth as needed for Pain., Disp: , Rfl:     Cholecalciferol (VITAMIN D-3 PO), Take  by mouth daily. 1-2 tabs, Disp: , Rfl:     fexofenadine (ALLEGRA) 180 MG tablet, Take 180 mg by mouth daily as needed. , Disp: , Rfl:     fluticasone (FLONASE) 50 MCG/ACT nasal spray, 1 spray by Nasal route as needed. , Disp: , Rfl:     MULTIPLE VITAMIN PO, Take  by mouth daily. , Disp: , Rfl:     Past Medical History  Chandni Alfonso  has a past medical history of AR (allergic rhinitis), Hypercholesteremia, and Hypertension.     Social History  Chandni Alfonso  reports that he has never smoked. He has never used smokeless tobacco. He reports current alcohol use. He reports that he does not use drugs. Family History  Yessenia Diaz family history includes Heart Disease in his paternal grandmother; High Cholesterol in his father; Other in his father; Stroke in his maternal grandfather and paternal grandfather. Past Surgical History   Past Surgical History:   Procedure Laterality Date    CARDIOVASCULAR STRESS TEST      INGUINAL HERNIA REPAIR Right 6-13-14    Robot assisted[facility]    STOMACH SURGERY  1978    Pyloric Stenosis    WISDOM TOOTH EXTRACTION  1996       Review of Systems   Constitutional: Negative for chills and fever  HENT: Negative for congestion, sinus pressure, sneezing and sore throat. Eyes: Negative for pain, discharge, redness and itching. Respiratory: Negative for apnea, cough  Gastrointestinal: Negative for blood in stool, constipation, diarrhea   Endocrine: Negative for cold intolerance, heat intolerance, polydipsia. Genitourinary: Negative for dysuria, enuresis, flank pain and hematuria. Musculoskeletal: Negative for arthralgias, joint swelling and neck pain. Neurological: Negative for numbness and headaches. Psychiatric/Behavioral: Negative for agitation, confusion, decreased concentration and dysphoric mood. Objective: There were no vitals taken for this visit. Wt Readings from Last 3 Encounters:   08/02/22 176 lb (79.8 kg)   12/29/21 186 lb 3.2 oz (84.5 kg)   09/21/21 178 lb (80.7 kg)     BP Readings from Last 3 Encounters:   08/02/22 (!) 142/96   12/29/21 134/82   09/21/21 123/83       Nursing note and vitals reviewed. Physical Exam   Constitutional: Oriented to person, place, and time. Appears well-developed and well-nourished. ENT: Moist mucous membranes. No bleeding. Tongue is midline. Head: Normocephalic and atraumatic. Eyes: EOM are normal. Pupils are equal, round, and reactive to light. Neck: Normal range of motion. Neck supple.  No JVD present. Cardiovascular: Normal rate, regular rhythm, II/VI systolic murmur, no rubs, and intact distal pulses. Pulmonary/Chest: Effort normal and breath sounds normal. No respiratory distress. No wheezes. No rales. Abdominal: Soft. Bowel sounds are normal. No distension. There is no tenderness. Musculoskeletal: Normal range of motion. no edema. Neurological: Alert and oriented to person, place, and time. No cranial nerve deficit. Coordination normal.   Skin: Skin is warm and dry. Psychiatric: Normal mood and affect.        No results found for: CKTOTAL, CKMB, CKMBINDEX    Lab Results   Component Value Date/Time    WBC 4.3 02/19/2020 06:16 AM    RBC 4.44 02/19/2020 06:16 AM    HGB 13.2 02/19/2020 06:16 AM    HCT 40.9 02/19/2020 06:16 AM    MCV 92.1 02/19/2020 06:16 AM    MCH 29.7 02/19/2020 06:16 AM    MCHC 32.3 02/19/2020 06:16 AM     02/19/2020 06:16 AM    MPV 10.6 02/19/2020 06:16 AM       Lab Results   Component Value Date/Time     02/19/2020 06:16 AM    K 3.9 02/19/2020 06:16 AM     02/19/2020 06:16 AM    CO2 27 02/19/2020 06:16 AM    BUN 20 02/19/2020 06:16 AM    LABALBU 4.7 12/26/2018 10:13 PM    CREATININE 1.0 02/19/2020 06:16 AM    CALCIUM 9.4 02/19/2020 06:16 AM    LABGLOM 82 02/19/2020 06:16 AM    GLUCOSE 92 02/19/2020 06:16 AM       Lab Results   Component Value Date/Time    ALKPHOS 43 12/26/2018 10:13 PM    ALT 52 12/26/2018 10:13 PM    AST 55 12/26/2018 10:13 PM    PROT 7.4 12/26/2018 10:13 PM    BILITOT 0.3 12/26/2018 10:13 PM    BILIDIR <0.2 12/26/2018 10:13 PM    LABALBU 4.7 12/26/2018 10:13 PM       No results found for: MG    Lab Results   Component Value Date    INR 0.98 02/19/2020         No results found for: LABA1C    Lab Results   Component Value Date/Time    TRIG 60 06/01/2020 12:00 AM    HDL 53 06/01/2020 12:00 AM    LDLCALC 102 06/01/2020 12:00 AM       No results found for: TSH      Testing Reviewed:      I have individually reviewed the cardiac test below:    ECHO: Results for orders placed during the hospital encounter of 09/27/21    Echo 2D w doppler w color complete    Narrative  Transthoracic Echocardiography Report (TTE)    Demographics    Patient Name   Saurabh Barroso  Gender              Male  R    MR #           [id number]        Race                    Ethnicity    Account #      [de-identified]        Room Number    Accession      [id number]       Date of Study       09/27/2021  Number    Date of Birth  1980       Referring Physician Kb Andrews MD    Age            35 year(s)       Sonographer         Sangeetha Rhodes  T    Interpreting        Janae Andrews MD  Physician    Procedure    Type of Study    TTE procedure:ECHOCARDIOGRAM COMPLETE 2D W DOPPLER W COLOR. Procedure Date  Date: 09/27/2021 Start: 08:20 AM    Study Location: Echo Lab  Technical Quality: Adequate visualization    Indications:Lower extremity edema. Additional Medical History:Hypertension, Hyperlipidemia, Family history of  heart disease, Dyspnea on exertion, Sleep apnea, PFO. Patient Status: Routine    Height: 70 inches Weight: 178 pounds BSA: 1.99 m^2 BMI: 25.54 kg/m^2    BP: 123/83 mmHg    Allergies  - No Known Allergies. Conclusions    Summary  Normal left ventricle size and systolic function. Ejection fraction was  estimated at 55%. There were no regional left ventricular wall motion  abnormalities and wall thickness was within normal limits. The right ventricular size was normal with normal systolic function and  wall thickness. Signature    ----------------------------------------------------------------  Electronically signed by Janae Andrews MD (Interpreting  physician) on 09/27/2021 at 01:19 PM  ----------------------------------------------------------------    Findings    Mitral Valve  The mitral valve structure was normal with normal leaflet separation.   DOPPLER: The transmitral velocity was within the normal range with no  evidence for mitral stenosis. There was no evidence of mitral  regurgitation. Aortic Valve  The aortic valve was trileaflet with normal thickness and cuspal  separation. DOPPLER: Transaortic velocity was within the normal range with  no evidence of aortic stenosis. There was no evidence of aortic  regurgitation. Tricuspid Valve  The tricuspid valve structure was normal with normal leaflet separation. DOPPLER: There was no evidence of tricuspid stenosis. There was no  evidence of tricuspid regurgitation. Pulmonic Valve  The pulmonic valve leaflets exhibited normal thickness, no calcification,  and normal cuspal separation. DOPPLER: The transpulmonic velocity was  within the normal range with no evidence for regurgitation. Left Atrium  Left atrial size was normal.    Left Ventricle  Normal left ventricle size and systolic function. Ejection fraction was  estimated at 55%. There were no regional left ventricular wall motion  abnormalities and wall thickness was within normal limits. Right Atrium  Right atrial size was normal.    Right Ventricle  The right ventricular size was normal with normal systolic function and  wall thickness. Pericardial Effusion  The pericardium was normal in appearance with no evidence of a pericardial  effusion. Pleural Effusion  No evidence of pleural effusion. Aorta / Great Vessels  -Aortic root dimension within normal limits.  -The Pulmonary artery is within normal limits. -IVC size is within normal limits with normal respiratory phasic changes.     M-Mode/2D Measurements & Calculations    LV Diastolic    LV Systolic Dimension:    AV Cusp Separation: 2.5 cmLA  Dimension: 5.2  3.8 cm                    Dimension: 3 cmAO Root  cm              LV Volume Diastolic:      Dimension: 3.5 cmLA Area: 17.2  LV FS:26.9 %    135.5 ml                  cm^2  LV PW           LV Volume Systolic: 62 ml  Diastolic: 0.8  LV EDV/LV EDV Index:  cm              135.5 ml/68 m^2LV ESV/LV  Septum          ESV Index: 62 QN/35 m^2   RV Diastolic Dimension: 2.9 cm  Diastolic: 0.7  EF Calculated: 54.2 %  cm                                        LA/Aorta: 0.86    LA volume/Index: 41.1 ml /21m^2    Doppler Measurements & Calculations    MV Peak E-Wave: 44.7 cm/s  AV Peak Velocity: 124 LVOT Peak Velocity: 86.7  MV Peak A-Wave: 28.8 cm/s  cm/s                  cm/s  MV E/A Ratio: 1.55         AV Peak Gradient:     LVOT Peak Gradient: 3  MV Peak Gradient: 0.8 mmHg 6.15 mmHg             mmHg    MV Deceleration Time: 310                        TV Peak E-Wave: 34.5 cm/s  msec                                             TV Peak A-Wave: 17.9 cm/s    IVRT: 95 msec         TV Peak Gradient: 0.48  MV E' Septal Velocity: 7.7                       mmHg  cm/s                                             TR Velocity:171 cm/s  MV A' Septal Velocity: 8.1 AV DVI (Vmax):0.7     TR Gradient:11.7 mmHg  cm/s                                             PV Peak Velocity: 51 cm/s  MV E' Lateral Velocity:                          PV Peak Gradient: 1.04  16.6 cm/s                                        mmHg  MV A' Lateral Velocity:  9.5 cm/s  E/E' septal: 5.81  E/E' lateral: 2.69    [email or web address] [id number]       Ekg:   EKG Interpretation:  normal EKG, normal sinus rhythm, unchanged from previous tracings. MONI:06/3598  FINDINGS:     HEMODYNAMICS:  LVEDP 60 mmHg. LEFT VENTRICULOGRAPHY:  No regional wall motion abnormality noted. Ejection fraction estimated at 60%. CORONARY ANGIOGRAM:  1. Left main coronary artery. Left main coronary artery is patent. There is mild diffuse disease in the distal part of left main. Left  main bifurcates into LCX and LAD. 2  Left circumflex artery. Proximal LCX is patent. OM1 is relatively  large branching vessel that has 30% lesion.   Distal LCX is patent  without significant stenotic lesions. LPL has mild diffuse disease. 3  Left anterior descending artery. Proximal LAD has mild to moderately  calcified 30% lesion. Mid LAD is patent. No significant stenosis. Distal LAD is patent. No significant stenosis. D1 has an ostial 20%  lesion, moderate-sized vessel. 4.  Right coronary artery. Proximal RCA has 10% to 20% lesion. Mid RCA  has 30% to 40% lesion. Distal RCA is patent without significant  stenotic lesions. RCA is a dominant vessel. RPDA has mild diffuse  disease. This seems to be a codominant system. AssessmentPlan:     Kaley Weeks is a pleasant 38year old male patient who  has a past medical history of AR (allergic rhinitis), Hypercholesteremia, and Hypertension. [facility] 02/2020 revealed nonobstructive CAD for which medical management was recommended. Echo 09/2021 revealed an EF of 55%. BP today is 136/92. He coaches soccer, walks on regular basis. Patient denies chest pain, shortness of breath, dyspnea on exertion, orthopnea, paroxysmal nocturnal dyspnea, palpitations, dizziness, syncope, weight gain or leg swelling. Denies exertional symptoms. Nonobstructive CAD. Cath 02/2020 (pLAD 30% Calcified lesion, RCA 40% lesion, OM 30%)  Dyslipidemia  Hypertension  DEANDRA history      [facility] 02/2020 revealed nonobstructive CAD for which medical management was recommended  Echo 09/2021 revealed an EF of 55%  EKG revealed NSR  Aggressive medical therapy and risk factor modification for CAD is indicated    Check lipid panel, LFT  Labs in next weeks   The patient is advised to begin progressive daily aerobic exercise program  Zocor  ASA  No bleeding issues  The patient was instructed to check the blood pressure at home, and record the readings.  Patient will call office with blood pressure readings, will adjust patient's antihypertensive medications as needed accordingly       Above findings and plan of care were discussed with patient in details, patient's questions were answered.      Disposition:  RTC in 12 months             Electronically signed by Montana Sandra MD, [facility], [place]    9/2/2022 at 1:09 PM EDT

## 2022-09-13 ENCOUNTER — OFFICE VISIT (OUTPATIENT)
Dept: CARDIOLOGY CLINIC | Age: 42
End: 2022-09-13
Payer: COMMERCIAL

## 2022-09-13 VITALS
HEART RATE: 61 BPM | WEIGHT: 179.38 LBS | HEIGHT: 70 IN | DIASTOLIC BLOOD PRESSURE: 92 MMHG | SYSTOLIC BLOOD PRESSURE: 136 MMHG | BODY MASS INDEX: 25.68 KG/M2

## 2022-09-13 DIAGNOSIS — I50.32 CHRONIC HEART FAILURE WITH PRESERVED EJECTION FRACTION (HFPEF) (HCC): Primary | ICD-10-CM

## 2022-09-13 PROCEDURE — G8419 CALC BMI OUT NRM PARAM NOF/U: HCPCS | Performed by: INTERNAL MEDICINE

## 2022-09-13 PROCEDURE — G8427 DOCREV CUR MEDS BY ELIG CLIN: HCPCS | Performed by: INTERNAL MEDICINE

## 2022-09-13 PROCEDURE — 93000 ELECTROCARDIOGRAM COMPLETE: CPT | Performed by: INTERNAL MEDICINE

## 2022-09-13 PROCEDURE — 99213 OFFICE O/P EST LOW 20 MIN: CPT | Performed by: INTERNAL MEDICINE

## 2022-09-13 PROCEDURE — 1036F TOBACCO NON-USER: CPT | Performed by: INTERNAL MEDICINE

## 2022-09-15 DIAGNOSIS — E78.5 DYSLIPIDEMIA: Primary | ICD-10-CM

## 2022-09-15 LAB
ALBUMIN SERPL-MCNC: 4.8 G/DL (ref 3.5–5.2)
ALK PHOSPHATASE: 39 U/L (ref 40–119)
ALT SERPL-CCNC: 22 U/L (ref 5–50)
AST SERPL-CCNC: 27 U/L (ref 9–50)
BILIRUB SERPL-MCNC: 0.5 MG/DL
BILIRUBIN DIRECT: <0.2 MG/DL (ref 0–0.3)
CHOLESTEROL/HDL RATIO: 3.4 RATIO
CHOLESTEROL: 165 MG/DL
HDLC SERPL-MCNC: 49 MG/DL
LDL CHOLESTEROL CALCULATED: 103 MG/DL
LDL/HDL RATIO: 2.1 RATIO
TOTAL PROTEIN: 6.9 G/DL (ref 6.1–8.3)
TRIGL SERPL-MCNC: 67 MG/DL
VLDLC SERPL CALC-MCNC: 13 MG/DL

## 2022-09-15 RX ORDER — ATORVASTATIN CALCIUM 40 MG/1
40 TABLET, FILM COATED ORAL DAILY
Qty: 90 TABLET | Refills: 3 | Status: SHIPPED | OUTPATIENT
Start: 2022-09-15

## 2022-09-15 NOTE — TELEPHONE ENCOUNTER
Result note from Lipid and Live panel    ----- Message from Irma Rawls MD sent at 9/15/2022 11:33 AM EDT -----  LDL \"bad cholesterol\" os not at goal  High intensity statin instead of zocor is recommended  Stop zocor  Start lipitor 40 mg po daily  Check LFT, Lipid panel in 3-4 months

## 2022-11-08 ENCOUNTER — HOSPITAL ENCOUNTER (EMERGENCY)
Age: 42
Discharge: HOME | End: 2022-11-08
Attending: EMERGENCY MEDICINE
Payer: COMMERCIAL

## 2022-11-08 ENCOUNTER — TELEPHONE (OUTPATIENT)
Dept: CARDIOLOGY CLINIC | Age: 42
End: 2022-11-08

## 2022-11-08 ENCOUNTER — APPOINTMENT (OUTPATIENT)
Dept: GENERAL RADIOLOGY | Age: 42
End: 2022-11-08
Payer: COMMERCIAL

## 2022-11-08 VITALS
TEMPERATURE: 98 F | HEART RATE: 79 BPM | RESPIRATION RATE: 20 BRPM | SYSTOLIC BLOOD PRESSURE: 155 MMHG | DIASTOLIC BLOOD PRESSURE: 99 MMHG | WEIGHT: 175 LBS | OXYGEN SATURATION: 100 % | HEIGHT: 70 IN | BODY MASS INDEX: 25.05 KG/M2

## 2022-11-08 DIAGNOSIS — R07.9 CHEST PAIN, UNSPECIFIED TYPE: Primary | ICD-10-CM

## 2022-11-08 LAB
ANION GAP SERPL CALCULATED.3IONS-SCNC: 13 MEQ/L (ref 8–16)
BASOPHILS # BLD: 0.4 %
BASOPHILS ABSOLUTE: 0 THOU/MM3 (ref 0–0.1)
BUN BLDV-MCNC: 15 MG/DL (ref 7–22)
CALCIUM SERPL-MCNC: 9.3 MG/DL (ref 8.5–10.5)
CHLORIDE BLD-SCNC: 102 MEQ/L (ref 98–111)
CO2: 26 MEQ/L (ref 23–33)
CREAT SERPL-MCNC: 0.9 MG/DL (ref 0.4–1.2)
EKG ATRIAL RATE: 61 BPM
EKG ATRIAL RATE: 69 BPM
EKG P AXIS: 59 DEGREES
EKG P AXIS: 64 DEGREES
EKG P-R INTERVAL: 128 MS
EKG P-R INTERVAL: 162 MS
EKG Q-T INTERVAL: 406 MS
EKG Q-T INTERVAL: 406 MS
EKG QRS DURATION: 78 MS
EKG QRS DURATION: 96 MS
EKG QTC CALCULATION (BAZETT): 408 MS
EKG QTC CALCULATION (BAZETT): 435 MS
EKG R AXIS: 12 DEGREES
EKG R AXIS: 31 DEGREES
EKG T AXIS: 18 DEGREES
EKG T AXIS: 48 DEGREES
EKG VENTRICULAR RATE: 61 BPM
EKG VENTRICULAR RATE: 69 BPM
EOSINOPHIL # BLD: 0.7 %
EOSINOPHILS ABSOLUTE: 0.1 THOU/MM3 (ref 0–0.4)
ERYTHROCYTE [DISTWIDTH] IN BLOOD BY AUTOMATED COUNT: 13 % (ref 11.5–14.5)
ERYTHROCYTE [DISTWIDTH] IN BLOOD BY AUTOMATED COUNT: 42.7 FL (ref 35–45)
GFR SERPL CREATININE-BSD FRML MDRD: > 60 ML/MIN/1.73M2
GLUCOSE BLD-MCNC: 153 MG/DL (ref 70–108)
HCT VFR BLD CALC: 37.9 % (ref 42–52)
HEMOGLOBIN: 12.5 GM/DL (ref 14–18)
IMMATURE GRANS (ABS): 0.02 THOU/MM3 (ref 0–0.07)
IMMATURE GRANULOCYTES: 0.2 %
INFLUENZA A: NOT DETECTED
INFLUENZA B: NOT DETECTED
LYMPHOCYTES # BLD: 13.1 %
LYMPHOCYTES ABSOLUTE: 1 THOU/MM3 (ref 1–4.8)
MCH RBC QN AUTO: 29.6 PG (ref 26–33)
MCHC RBC AUTO-ENTMCNC: 33 GM/DL (ref 32.2–35.5)
MCV RBC AUTO: 89.6 FL (ref 80–94)
MONOCYTES # BLD: 5.1 %
MONOCYTES ABSOLUTE: 0.4 THOU/MM3 (ref 0.4–1.3)
NUCLEATED RED BLOOD CELLS: 0 /100 WBC
OSMOLALITY CALCULATION: 285.1 MOSMOL/KG (ref 275–300)
PLATELET # BLD: 270 THOU/MM3 (ref 130–400)
PMV BLD AUTO: 9.8 FL (ref 9.4–12.4)
POTASSIUM SERPL-SCNC: 3.7 MEQ/L (ref 3.5–5.2)
PRO-BNP: 34 PG/ML (ref 0–450)
RBC # BLD: 4.23 MILL/MM3 (ref 4.7–6.1)
SARS-COV-2 RNA, RT PCR: NOT DETECTED
SEG NEUTROPHILS: 80.5 %
SEGMENTED NEUTROPHILS ABSOLUTE COUNT: 6.4 THOU/MM3 (ref 1.8–7.7)
SODIUM BLD-SCNC: 141 MEQ/L (ref 135–145)
TROPONIN T: < 0.01 NG/ML
WBC # BLD: 8 THOU/MM3 (ref 4.8–10.8)

## 2022-11-08 PROCEDURE — 99285 EMERGENCY DEPT VISIT HI MDM: CPT

## 2022-11-08 PROCEDURE — 71046 X-RAY EXAM CHEST 2 VIEWS: CPT

## 2022-11-08 PROCEDURE — 93010 ELECTROCARDIOGRAM REPORT: CPT | Performed by: NUCLEAR MEDICINE

## 2022-11-08 PROCEDURE — 6370000000 HC RX 637 (ALT 250 FOR IP): Performed by: STUDENT IN AN ORGANIZED HEALTH CARE EDUCATION/TRAINING PROGRAM

## 2022-11-08 PROCEDURE — 85025 COMPLETE CBC W/AUTO DIFF WBC: CPT

## 2022-11-08 PROCEDURE — 84484 ASSAY OF TROPONIN QUANT: CPT

## 2022-11-08 PROCEDURE — 83880 ASSAY OF NATRIURETIC PEPTIDE: CPT

## 2022-11-08 PROCEDURE — 93005 ELECTROCARDIOGRAM TRACING: CPT | Performed by: STUDENT IN AN ORGANIZED HEALTH CARE EDUCATION/TRAINING PROGRAM

## 2022-11-08 PROCEDURE — 80048 BASIC METABOLIC PNL TOTAL CA: CPT

## 2022-11-08 PROCEDURE — 87636 SARSCOV2 & INF A&B AMP PRB: CPT

## 2022-11-08 PROCEDURE — 99215 OFFICE O/P EST HI 40 MIN: CPT

## 2022-11-08 PROCEDURE — 36415 COLL VENOUS BLD VENIPUNCTURE: CPT

## 2022-11-08 PROCEDURE — 93005 ELECTROCARDIOGRAM TRACING: CPT | Performed by: EMERGENCY MEDICINE

## 2022-11-08 RX ORDER — ASPIRIN 81 MG/1
324 TABLET, CHEWABLE ORAL ONCE
Status: COMPLETED | OUTPATIENT
Start: 2022-11-08 | End: 2022-11-08

## 2022-11-08 RX ORDER — ACETAMINOPHEN 325 MG/1
650 TABLET ORAL ONCE
Status: COMPLETED | OUTPATIENT
Start: 2022-11-08 | End: 2022-11-08

## 2022-11-08 RX ADMIN — ACETAMINOPHEN 650 MG: 325 TABLET ORAL at 15:10

## 2022-11-08 RX ADMIN — ASPIRIN 81 MG 324 MG: 81 TABLET ORAL at 15:10

## 2022-11-08 ASSESSMENT — PAIN DESCRIPTION - LOCATION: LOCATION: CHEST

## 2022-11-08 ASSESSMENT — PAIN SCALES - GENERAL
PAINLEVEL_OUTOF10: 0
PAINLEVEL_OUTOF10: 3

## 2022-11-08 ASSESSMENT — PAIN - FUNCTIONAL ASSESSMENT: PAIN_FUNCTIONAL_ASSESSMENT: 0-10

## 2022-11-08 ASSESSMENT — ENCOUNTER SYMPTOMS
COUGH: 0
VOMITING: 0
SHORTNESS OF BREATH: 0
RHINORRHEA: 0
SORE THROAT: 0
CHEST TIGHTNESS: 1
NAUSEA: 1
DIARRHEA: 0

## 2022-11-08 ASSESSMENT — PAIN DESCRIPTION - DESCRIPTORS: DESCRIPTORS: PRESSURE

## 2022-11-08 ASSESSMENT — PAIN DESCRIPTION - ORIENTATION: ORIENTATION: MID

## 2022-11-08 NOTE — ED TRIAGE NOTES
Patient presents to  with c/o midsternal chest pressure and HTN that started yesterday evening. Patient reports he has been having intermittent left sided jaw pain since last evening as well. Patient reports he has a Hx of heart failure and follows Dr. Sabiha Castano. Patient reports he did have a cardiac cath in 2020 which showed some blockage although no stents were placed. EKG complete. Pt alert and oriented x4.  Chest pressure rated 3/10

## 2022-11-08 NOTE — DISCHARGE INSTRUCTIONS
Continue taking your medications as prescribed. I recommend you take your blood pressure 3 times per day and have those measurements available for the heart doctor on Thursday. I have made you an appointment for 9:30 in the morning here at [facility] heart specialists. If you begin having recurrent chest pain, difficulty breathing or worsening shortness of breath do not hesitate to return to the ED immediately.

## 2022-11-08 NOTE — TELEPHONE ENCOUNTER
Please see My Chart Message:     David Jennings \"Henrique\"  [facility] Clinical Support Pool (supporting Kt Kramer MD) 25 minutes ago (10:53 AM)     RW  At my last appointment Dr. Juan R Singletary asked me to keep an eye on my blood pressure numbers. Here are some numbers:     10/14 - 131/85  10/17 - 132/87  10/19 - 132/87  10/24 - 140/93  10/25 - 132/90  10/26 - 129/91  10/27 - 146/86  10/30 - 126/88  11/08 - 137/92     Let me know if you need additional information.      Thank you,     William Nails

## 2022-11-08 NOTE — ED NOTES
Pt to ED via private vehicle from . Pt VSS. Repeat EKG obtained and lab work sent. Pt heading to xray.       Concepción Herron  11/08/22 [id number]

## 2022-11-08 NOTE — ED NOTES
Pt medicated per MAR. Covid and flu swab collected at this time. Pt resting on cot comfortably.  No concerns voiced     Homer Manley  11/08/22 3822

## 2022-11-08 NOTE — ED PROVIDER NOTES
[address] [facility] [address] [address] EMERGENCY DEPT  Urgent Care Encounter       CHIEF COMPLAINT       Chief Complaint   Patient presents with    Chest Pain       Nurses Notes reviewed and I agree except as noted in the HPI. HISTORY OF PRESENT ILLNESS   Jessica Yuan is a 38 y.o. male who presents to the [facility] ambulatory care center for evaluation of chest pressure. He reports having a headache which started last night. He reports his headache does radiate to his jaw. He does report sternal chest pressure. He rates it currently 4 out of 10. He does state that it feels like indigestion. He does report mild nausea, denies diaphoresis. An echocardiogram was completed 9/27/2021 which revealed an ejection fraction of 55 with normal left and right ventricle size and function. He did have a cardiac cath completed 2/19/2020. He was noted to have several small lesions the greatest being 40%. He denies nasal congestion, rhinorrhea, postnasal drainage, or cough. He denies dyspnea. Denies emesis or diarrhea. Denies fever or chills. The history is provided by the patient. No  was used. REVIEW OF SYSTEMS     Review of Systems   Constitutional:  Negative for activity change, appetite change, chills, fatigue and fever. HENT:  Negative for ear discharge, ear pain, rhinorrhea and sore throat. Respiratory:  Positive for chest tightness (Jaw pain). Negative for cough and shortness of breath. Cardiovascular:  Negative for chest pain. Gastrointestinal:  Positive for nausea. Negative for diarrhea and vomiting. Genitourinary:  Negative for dysuria. Skin:  Negative for rash. Allergic/Immunologic: Negative for environmental allergies and food allergies. Neurological:  Positive for headaches. Negative for dizziness.      PAST MEDICAL HISTORY         Diagnosis Date    AR (allergic rhinitis)     Hypercholesteremia     BORDERLINE    Hypertension        SURGICALHISTORY     Patient  has a past surgical history that includes Stomach surgery (1978); [place] tooth extraction (1996); Inguinal hernia repair (Right, 6-13-14); and cardiovascular stress test.    CURRENT MEDICATIONS       Previous Medications    ASPIRIN 81 MG TABLET    Take 81 mg by mouth daily    ATORVASTATIN (LIPITOR) 40 MG TABLET    Take 1 tablet by mouth daily    CHOLECALCIFEROL (VITAMIN D-3 PO)    Take  by mouth daily. 1-2 tabs    COENZYME Q10 (CO Q 10) 10 MG CAPS    Take by mouth daily     CPAP MACHINE MISC    by Does not apply route Please change CPAP pressure to 6-11 cm H20.    FEXOFENADINE (ALLEGRA) 180 MG TABLET    Take 180 mg by mouth daily as needed. FLUTICASONE (FLONASE) 50 MCG/ACT NASAL SPRAY    1 spray by Nasal route as needed. IBUPROFEN (ADVIL;MOTRIN) 200 MG TABLET    Take 200 mg by mouth as needed for Pain. LOSARTAN (COZAAR) 100 MG TABLET    Take 1 tablet by mouth nightly    MULTIPLE VITAMIN PO    Take  by mouth daily. ALLERGIES     Patient is has No Known Allergies. Patients   Immunization History   Administered Date(s) Administered    COVID-19, MODERNA BLUE border, Primary or Immunocompromised, (age 12y+), IM, 100 mcg/0.5mL 02/13/2021, 03/13/2021    Influenza Virus Vaccine 09/23/2021    Influenza, AFLURIA (age 1 yrs+), FLUZONE, (age 10 mo+), MDV, 0.5mL 10/17/2012, 10/03/2013    Influenza, FLUARIX, FLULAVAL, FLUZONE (age 10 mo+) AND AFLURIA, (age 1 y+), PF, 0.5mL 10/13/2015, 10/09/2017, 09/25/2018    Influenza, MDCK, Preservative free 10/11/2016    Tdap (Boostrix, Adacel) 11/15/2017       FAMILY HISTORY     Patient's family history includes Heart Disease in his paternal grandmother; High Cholesterol in his father; Other in his father; Stroke in his maternal grandfather and paternal grandfather. SOCIAL HISTORY     Patient  reports that he has never smoked. He has never used smokeless tobacco. He reports current alcohol use. He reports that he does not use drugs.     PHYSICAL EXAM     ED TRIAGE VITALS  BP: (!) 155/99, Temp: 98 °F (36.7 °C), Heart Rate: 79, Resp: 20, SpO2: 100 %,Estimated body mass index is 25.11 kg/m² as calculated from the following:    Height as of this encounter: 5' 10\" (1.778 m). Weight as of this encounter: 175 lb (79.4 kg). ,No LMP for male patient. Physical Exam  Vitals and nursing note reviewed. Constitutional:       General: He is not in acute distress. Appearance: Normal appearance. He is not ill-appearing, toxic-appearing or diaphoretic. HENT:      Head: Normocephalic. Right Ear: Ear canal and external ear normal.      Left Ear: Ear canal and external ear normal.      Nose: Nose normal. No congestion or rhinorrhea. Mouth/Throat:      Mouth: Mucous membranes are moist.      Pharynx: Oropharynx is clear. No oropharyngeal exudate or posterior oropharyngeal erythema. Cardiovascular:      Rate and Rhythm: Normal rate. Pulses: Normal pulses. Pulmonary:      Effort: Pulmonary effort is normal. No respiratory distress. Breath sounds: No stridor. No wheezing or rhonchi. Abdominal:      General: Abdomen is flat. Bowel sounds are normal.      Palpations: Abdomen is soft. Musculoskeletal:         General: No swelling or tenderness. Normal range of motion. Cervical back: Normal range of motion. Neurological:      General: No focal deficit present. Mental Status: He is alert and oriented to person, place, and time. Psychiatric:         Mood and Affect: Mood normal.         Behavior: Behavior normal.       DIAGNOSTIC RESULTS     Labs:No results found for this visit on 11/08/22. IMAGING:    No orders to display         EKG: Sinus rhythm with a ventricular rate of 61, SD interval 162, QRS 96, , and QTc 408. No ST or T wave changes.       URGENT CARE COURSE:     Vitals:    11/08/22 1344   BP: (!) 155/99   Pulse: 79   Resp: 20   Temp: 98 °F (36.7 °C)   SpO2: 100%   Weight: 175 lb (79.4 kg)   Height: 5' 10\" (1.778 m)       Medications - No data to display PROCEDURES:  None    FINAL IMPRESSION      1. Chest pain, unspecified type          DISPOSITION/ PLAN     Patient seen and evaluated for chest pressure. We did have a lengthy discussion that at urgent care we were unable to rule out ACS. Patient did elect to be transferred to [facility] emergency department. And EMS transport was offered, but the patient elected to be transferred by private vehicle via wife. I did attempt to reach the emergency department to discuss the patient, but unable to reach staff. Patient was instructed to go directly to [facility] emergency department not eat or drink in the way. Patient was agreeable to the above plan and denies questions or concerns at this time.       PATIENT REFERRED TO:  MD OFELIA Hamilton [address] [address]      DISCHARGE MEDICATIONS:  New Prescriptions    No medications on file       Discontinued Medications    No medications on file       Current Discharge Medication List          [facility]    (Please note that portions of this note were completed with a voice recognition program. Efforts were made to edit the dictations but occasionally words are mis-transcribed.)           TAYLOR Ferreira - CNP  11/08/22 1400

## 2022-11-10 ENCOUNTER — OFFICE VISIT (OUTPATIENT)
Dept: CARDIOLOGY CLINIC | Age: 42
End: 2022-11-10
Payer: COMMERCIAL

## 2022-11-10 VITALS
HEIGHT: 70 IN | WEIGHT: 177.6 LBS | BODY MASS INDEX: 25.43 KG/M2 | HEART RATE: 80 BPM | SYSTOLIC BLOOD PRESSURE: 134 MMHG | DIASTOLIC BLOOD PRESSURE: 80 MMHG

## 2022-11-10 DIAGNOSIS — I10 PRIMARY HYPERTENSION: Primary | ICD-10-CM

## 2022-11-10 DIAGNOSIS — E78.01 FAMILIAL HYPERCHOLESTEROLEMIA: ICD-10-CM

## 2022-11-10 PROCEDURE — G8419 CALC BMI OUT NRM PARAM NOF/U: HCPCS | Performed by: NUCLEAR MEDICINE

## 2022-11-10 PROCEDURE — 99214 OFFICE O/P EST MOD 30 MIN: CPT | Performed by: NUCLEAR MEDICINE

## 2022-11-10 PROCEDURE — G8427 DOCREV CUR MEDS BY ELIG CLIN: HCPCS | Performed by: NUCLEAR MEDICINE

## 2022-11-10 PROCEDURE — 3078F DIAST BP <80 MM HG: CPT | Performed by: NUCLEAR MEDICINE

## 2022-11-10 PROCEDURE — 1036F TOBACCO NON-USER: CPT | Performed by: NUCLEAR MEDICINE

## 2022-11-10 PROCEDURE — G8484 FLU IMMUNIZE NO ADMIN: HCPCS | Performed by: NUCLEAR MEDICINE

## 2022-11-10 PROCEDURE — 3074F SYST BP LT 130 MM HG: CPT | Performed by: NUCLEAR MEDICINE

## 2022-11-10 ASSESSMENT — ENCOUNTER SYMPTOMS
RECTAL PAIN: 0
SHORTNESS OF BREATH: 0
ABDOMINAL PAIN: 0
CONSTIPATION: 0
NAUSEA: 0
BLOOD IN STOOL: 0
DIARRHEA: 0
ABDOMINAL DISTENTION: 0
BACK PAIN: 0
VOMITING: 0
ANAL BLEEDING: 0
COLOR CHANGE: 0

## 2022-11-10 NOTE — PROGRESS NOTES
[id number]  [place] [address]  Dept: [phone]  Dept Fax: [phone]  Loc: [phone]    Visit Date: 11/10/2022    Miguel Barrios is a 38 y.o. male who presents todayfor:  Chief Complaint   Patient presents with    Follow-up    Hypertension    Hyperlipidemia    Chest Pain   Here from the ER  Patient follows with melhem   Started Monday   Had high BP and headache  Some neck and jaw pain   Was there for an hour  Some epigastric symptoms  Indigestion type symptoms  BP was as high as 694X and 90 diastolic  Better but still high diastolic  Some headache  No changes in breathing  Does have HTn and hyperlipidemia  Cath 2020   Was okay   No smoking   Family history of CAD      HPI:  Hypertension  Associated symptoms include chest pain. Pertinent negatives include no neck pain or shortness of breath. Hyperlipidemia  Associated symptoms include chest pain. Pertinent negatives include no myalgias or shortness of breath. Chest Pain   Pertinent negatives include no abdominal pain, back pain, diaphoresis, dizziness, nausea, shortness of breath or vomiting. His past medical history is significant for hyperlipidemia.    Past Medical History:   Diagnosis Date    AR (allergic rhinitis)     Hypercholesteremia     BORDERLINE    Hypertension       Past Surgical History:   Procedure Laterality Date    CARDIOVASCULAR STRESS TEST      INGUINAL HERNIA REPAIR Right 6-13-14    Robot assisted-[place]    STOMACH SURGERY  1978    Pyloric Stenosis    WISDOM TOOTH EXTRACTION  1996     Family History   Problem Relation Age of Onset    High Cholesterol Father     Other Father         afib    Stroke Maternal Grandfather     Heart Disease Paternal Grandmother     Stroke Paternal Grandfather     Prostate Cancer Neg Hx      Social History     Tobacco Use    Smoking status: Never    Smokeless tobacco: Never   Substance Use Topics    Alcohol use: Yes     Types: 4 Cans of beer per week     Comment: moderate      Current Outpatient Medications   Medication Sig Dispense Refill    atorvastatin (LIPITOR) 40 MG tablet Take 1 tablet by mouth daily 90 tablet 3    losartan (COZAAR) 100 MG tablet Take 1 tablet by mouth nightly 90 tablet 3    CPAP Machine MISC by Does not apply route Please change CPAP pressure to 6-11 cm H20. 1 each 0    aspirin 81 MG tablet Take 81 mg by mouth daily      Coenzyme Q10 (CO Q 10) 10 MG CAPS Take by mouth daily       ibuprofen (ADVIL;MOTRIN) 200 MG tablet Take 200 mg by mouth as needed for Pain. Cholecalciferol (VITAMIN D-3 PO) Take  by mouth daily. 1-2 tabs      fexofenadine (ALLEGRA) 180 MG tablet Take 180 mg by mouth daily as needed. fluticasone (FLONASE) 50 MCG/ACT nasal spray 1 spray by Nasal route as needed. MULTIPLE VITAMIN PO Take  by mouth daily. No current facility-administered medications for this visit. No Known Allergies  Health Maintenance   Topic Date Due    Pneumococcal 0-64 years Vaccine (1 - PCV) Never done    Diabetes screen  Never done    COVID-19 Vaccine (3 - Booster for Moderna series) 05/08/2021    Flu vaccine (1) 08/01/2022    Depression Screen  08/02/2023    Lipids  09/14/2023    DTaP/Tdap/Td vaccine (2 - Td or Tdap) 11/15/2027    Hepatitis A vaccine  Aged Out    Hib vaccine  Aged Out    Meningococcal (ACWY) vaccine  Aged Out    Hepatitis C screen  Discontinued    HIV screen  Discontinued       Subjective:  Review of Systems   Constitutional:  Positive for fatigue. Negative for diaphoresis. HENT:  Negative for drooling and hearing loss. Respiratory:  Negative for shortness of breath. Cardiovascular:  Positive for chest pain. Gastrointestinal:  Negative for abdominal distention, abdominal pain, anal bleeding, blood in stool, constipation, diarrhea, nausea, rectal pain and vomiting. Endocrine: Negative for polyphagia. Genitourinary:  Negative for enuresis and hematuria. Musculoskeletal:  Negative for arthralgias, back pain, gait problem, joint swelling, myalgias, neck pain and neck stiffness. Skin:  Negative for color change, pallor and rash. Allergic/Immunologic: Negative for food allergies. Neurological:  Negative for dizziness, syncope and light-headedness. Psychiatric/Behavioral:  Negative for behavioral problems, confusion, decreased concentration and dysphoric mood. Objective:  Physical Exam  HENT:      Head: Normocephalic. Right Ear: Tympanic membrane normal.      Nose: Nose normal.      Mouth/Throat:      Mouth: Mucous membranes are moist.   Eyes:      Pupils: Pupils are equal, round, and reactive to light. Cardiovascular:      Rate and Rhythm: Normal rate and regular rhythm. Heart sounds: Murmur heard. No gallop. Pulmonary:      Effort: No respiratory distress. Breath sounds: No stridor. No wheezing, rhonchi or rales. Chest:      Chest wall: No tenderness. Abdominal:      General: There is no distension. Palpations: There is no mass. Tenderness: There is no abdominal tenderness. There is no right CVA tenderness, guarding or rebound. Hernia: No hernia is present. Musculoskeletal:      Cervical back: Normal range of motion. Neurological:      Mental Status: He is alert. /80   Pulse 80   Ht 5' 10\" (1.778 m)   Wt 177 lb 9.6 oz (80.6 kg)   BMI 25.48 kg/m²     Assessment:      Diagnosis Orders   1. Primary hypertension        2. Familial hypercholesterolemia        As above  Higher BP  Symptoms as above  ER visit for high BP    Plan:  No follow-ups on file. Discussed  Change to diovan   Follow with To   Continue risk factor modification and medical management  Thank you for allowing me to participate in the care of your patient.  Please don't hesitate to contact me regarding any further issues related to the patient care    Orders Placed:  No orders of the defined types were placed in this encounter. Medications Prescribed:  No orders of the defined types were placed in this encounter. Discussed use, benefit, and side effects of prescribed medications. All patient questions answered. Pt voicedunderstanding. Instructed to continue current medications, diet and exercise. Continue risk factor modification and medical management. Patient agreed with treatment plan. Follow up as directed.     Electronically signedby Shoshana Vo MD on 11/10/2022 at 9:58 AM

## 2022-12-12 ENCOUNTER — TELEPHONE (OUTPATIENT)
Dept: CARDIOLOGY CLINIC | Age: 42
End: 2022-12-12

## 2022-12-12 ENCOUNTER — HOSPITAL ENCOUNTER (OUTPATIENT)
Age: 42
Discharge: HOME | End: 2022-12-12
Payer: COMMERCIAL

## 2022-12-12 DIAGNOSIS — I73.9 CLAUDICATION (HCC): Primary | ICD-10-CM

## 2022-12-12 DIAGNOSIS — E78.5 DYSLIPIDEMIA: ICD-10-CM

## 2022-12-12 LAB
ALBUMIN SERPL-MCNC: 4.6 G/DL (ref 3.5–5.1)
ALP BLD-CCNC: 33 U/L (ref 38–126)
ALT SERPL-CCNC: 26 U/L (ref 11–66)
AST SERPL-CCNC: 28 U/L (ref 5–40)
BILIRUB SERPL-MCNC: 0.5 MG/DL (ref 0.3–1.2)
BILIRUBIN DIRECT: < 0.2 MG/DL (ref 0–0.3)
CHOLESTEROL, TOTAL: 138 MG/DL (ref 100–199)
HDLC SERPL-MCNC: 50 MG/DL
LDL CHOLESTEROL CALCULATED: 75 MG/DL
TOTAL PROTEIN: 6.6 G/DL (ref 6.1–8)
TRIGL SERPL-MCNC: 65 MG/DL (ref 0–199)

## 2022-12-12 PROCEDURE — 80061 LIPID PANEL: CPT

## 2022-12-12 PROCEDURE — 80076 HEPATIC FUNCTION PANEL: CPT

## 2022-12-12 PROCEDURE — 36415 COLL VENOUS BLD VENIPUNCTURE: CPT

## 2022-12-12 RX ORDER — ATORVASTATIN CALCIUM 40 MG/1
20 TABLET, FILM COATED ORAL DAILY
COMMUNITY
End: 2022-12-13 | Stop reason: SDUPTHER

## 2022-12-12 NOTE — TELEPHONE ENCOUNTER
----- Message from Yasmin Kwan Starlet Flax sent at 12/11/2022 10:01 PM EST -----  Regarding: atorvastatin  I am curious as to whether or not atorvastatin can cause sciatica? The sciatica is affecting my left leg and has been getting worse since the beginning of November. I have been trying to gradually increase my exercise routine over the same period walking, jogging and light running on the treadmill. I had been doing some outdoor running during the fall prior to switching medication and dont recall having this pain. I dont know if this medication change from simvastatin to atorvastatin could be playing a role in this or if it is just coincidence that I was trying to get in better shape at the same time this started. I am planning on getting my labs that were ordered done tomorrow (Monday) morning in [place].      Thank you,    Archana Max

## 2022-12-12 NOTE — TELEPHONE ENCOUNTER
, An Ch \"Henrique\"  P [facility] Clinical Staff (supporting Kaylee Schmid CMA) 29 minutes ago (3:02 PM)     RW  Yes it is ok to order the ROSALVA. And yes I can cut the Lipitor in half. Thank you.

## 2022-12-12 NOTE — TELEPHONE ENCOUNTER
Statins (both lipitor and zocor) can use \"muscle aches, myalgia\", but usually it is more generalized.     Lower dose of Lipitor to 20 mg po daily, patient to monitor for symptoms    Check ROSALVA \"to screen/investigate for circulation problem\"    Advise patient to see PCP to assess for \"sciatica\", leg pain

## 2022-12-13 RX ORDER — ATORVASTATIN CALCIUM 40 MG/1
20 TABLET, FILM COATED ORAL DAILY
Qty: 45 TABLET | Refills: 0 | Status: SHIPPED | OUTPATIENT
Start: 2022-12-13

## 2022-12-13 NOTE — TELEPHONE ENCOUNTER
Romayne Reading called requesting a refill on the following medications:  Requested Prescriptions     Pending Prescriptions Disp Refills    atorvastatin (LIPITOR) 40 MG tablet 30 tablet      Sig: Take 0.5 tablets by mouth daily     Pharmacy verified: [facility]  . pv      Date of last visit: 9/13/2022  Date of next visit (if applicable): 8/73/8256    Pt states medication was changed to 20mg and he needs a new script for that mg.

## 2022-12-15 ENCOUNTER — HOSPITAL ENCOUNTER (OUTPATIENT)
Dept: INTERVENTIONAL RADIOLOGY/VASCULAR | Age: 42
Discharge: HOME | End: 2022-12-15
Payer: COMMERCIAL

## 2022-12-15 DIAGNOSIS — I73.9 CLAUDICATION (HCC): ICD-10-CM

## 2022-12-15 PROCEDURE — 93922 UPR/L XTREMITY ART 2 LEVELS: CPT

## 2022-12-18 ENCOUNTER — HOSPITAL ENCOUNTER (EMERGENCY)
Age: 42
Discharge: HOME | End: 2022-12-19
Attending: EMERGENCY MEDICINE
Payer: COMMERCIAL

## 2022-12-18 ENCOUNTER — APPOINTMENT (OUTPATIENT)
Dept: CT IMAGING | Age: 42
End: 2022-12-18
Payer: COMMERCIAL

## 2022-12-18 DIAGNOSIS — M51.36 BULGING OF LUMBAR INTERVERTEBRAL DISC: Primary | ICD-10-CM

## 2022-12-18 LAB
ALBUMIN SERPL-MCNC: 4.5 G/DL (ref 3.5–5.1)
ALP BLD-CCNC: 33 U/L (ref 38–126)
ALT SERPL-CCNC: 23 U/L (ref 11–66)
ANION GAP SERPL CALCULATED.3IONS-SCNC: 11 MEQ/L (ref 8–16)
AST SERPL-CCNC: 26 U/L (ref 5–40)
BILIRUB SERPL-MCNC: 0.3 MG/DL (ref 0.3–1.2)
BUN BLDV-MCNC: 17 MG/DL (ref 7–22)
CALCIUM SERPL-MCNC: 8.8 MG/DL (ref 8.5–10.5)
CHLORIDE BLD-SCNC: 103 MEQ/L (ref 98–111)
CO2: 27 MEQ/L (ref 23–33)
CREAT SERPL-MCNC: 0.8 MG/DL (ref 0.4–1.2)
GFR SERPL CREATININE-BSD FRML MDRD: > 60 ML/MIN/1.73M2
GLUCOSE BLD-MCNC: 117 MG/DL (ref 70–108)
MAGNESIUM: 1.9 MG/DL (ref 1.6–2.4)
OSMOLALITY CALCULATION: 283.8 MOSMOL/KG (ref 275–300)
POTASSIUM SERPL-SCNC: 3.6 MEQ/L (ref 3.5–5.2)
SODIUM BLD-SCNC: 141 MEQ/L (ref 135–145)
TOTAL PROTEIN: 6.4 G/DL (ref 6.1–8)

## 2022-12-18 PROCEDURE — 6360000002 HC RX W HCPCS: Performed by: EMERGENCY MEDICINE

## 2022-12-18 PROCEDURE — 2580000003 HC RX 258: Performed by: EMERGENCY MEDICINE

## 2022-12-18 PROCEDURE — 80053 COMPREHEN METABOLIC PANEL: CPT

## 2022-12-18 PROCEDURE — 83735 ASSAY OF MAGNESIUM: CPT

## 2022-12-18 PROCEDURE — 99284 EMERGENCY DEPT VISIT MOD MDM: CPT

## 2022-12-18 PROCEDURE — 36415 COLL VENOUS BLD VENIPUNCTURE: CPT

## 2022-12-18 PROCEDURE — 96375 TX/PRO/DX INJ NEW DRUG ADDON: CPT

## 2022-12-18 PROCEDURE — 85025 COMPLETE CBC W/AUTO DIFF WBC: CPT

## 2022-12-18 PROCEDURE — 96361 HYDRATE IV INFUSION ADD-ON: CPT

## 2022-12-18 PROCEDURE — 96374 THER/PROPH/DIAG INJ IV PUSH: CPT

## 2022-12-18 PROCEDURE — 72131 CT LUMBAR SPINE W/O DYE: CPT

## 2022-12-18 PROCEDURE — 96376 TX/PRO/DX INJ SAME DRUG ADON: CPT

## 2022-12-18 RX ORDER — KETOROLAC TROMETHAMINE 30 MG/ML
15 INJECTION, SOLUTION INTRAMUSCULAR; INTRAVENOUS ONCE
Status: COMPLETED | OUTPATIENT
Start: 2022-12-18 | End: 2022-12-18

## 2022-12-18 RX ORDER — 0.9 % SODIUM CHLORIDE 0.9 %
500 INTRAVENOUS SOLUTION INTRAVENOUS ONCE
Status: COMPLETED | OUTPATIENT
Start: 2022-12-18 | End: 2022-12-19

## 2022-12-18 RX ADMIN — HYDROMORPHONE HYDROCHLORIDE 1 MG: 1 INJECTION, SOLUTION INTRAMUSCULAR; INTRAVENOUS; SUBCUTANEOUS at 23:13

## 2022-12-18 RX ADMIN — KETOROLAC TROMETHAMINE 15 MG: 30 INJECTION, SOLUTION INTRAMUSCULAR at 23:13

## 2022-12-18 RX ADMIN — SODIUM CHLORIDE 500 ML: 9 INJECTION, SOLUTION INTRAVENOUS at 23:18

## 2022-12-18 ASSESSMENT — ENCOUNTER SYMPTOMS
NAUSEA: 0
SORE THROAT: 0
CHEST TIGHTNESS: 0
TROUBLE SWALLOWING: 0
WHEEZING: 0
VOICE CHANGE: 0
SHORTNESS OF BREATH: 0
VOMITING: 0
EYE PAIN: 0
CONSTIPATION: 0
BACK PAIN: 0
CHOKING: 0
ABDOMINAL DISTENTION: 0
SINUS PRESSURE: 0
BLOOD IN STOOL: 0
COUGH: 0
EYE REDNESS: 0
PHOTOPHOBIA: 0
ABDOMINAL PAIN: 0
EYE ITCHING: 0
DIARRHEA: 0
RHINORRHEA: 0
EYE DISCHARGE: 0

## 2022-12-18 ASSESSMENT — PAIN SCALES - GENERAL
PAINLEVEL_OUTOF10: 9
PAINLEVEL_OUTOF10: 9

## 2022-12-18 ASSESSMENT — PAIN DESCRIPTION - ORIENTATION: ORIENTATION: LEFT

## 2022-12-18 ASSESSMENT — PAIN DESCRIPTION - PAIN TYPE: TYPE: ACUTE PAIN

## 2022-12-18 ASSESSMENT — PAIN DESCRIPTION - LOCATION: LOCATION: LEG

## 2022-12-18 ASSESSMENT — PAIN - FUNCTIONAL ASSESSMENT: PAIN_FUNCTIONAL_ASSESSMENT: 0-10

## 2022-12-19 VITALS
RESPIRATION RATE: 18 BRPM | SYSTOLIC BLOOD PRESSURE: 178 MMHG | OXYGEN SATURATION: 94 % | HEIGHT: 70 IN | DIASTOLIC BLOOD PRESSURE: 95 MMHG | BODY MASS INDEX: 25.77 KG/M2 | WEIGHT: 180 LBS | HEART RATE: 60 BPM | TEMPERATURE: 98.7 F

## 2022-12-19 LAB
BASOPHILS # BLD: 0.5 %
BASOPHILS ABSOLUTE: 0 THOU/MM3 (ref 0–0.1)
BILIRUBIN URINE: NEGATIVE
BLOOD, URINE: NEGATIVE
CHARACTER, URINE: CLEAR
COLOR: YELLOW
EOSINOPHIL # BLD: 2 %
EOSINOPHILS ABSOLUTE: 0.2 THOU/MM3 (ref 0–0.4)
ERYTHROCYTE [DISTWIDTH] IN BLOOD BY AUTOMATED COUNT: 12.7 % (ref 11.5–14.5)
ERYTHROCYTE [DISTWIDTH] IN BLOOD BY AUTOMATED COUNT: 42.1 FL (ref 35–45)
GLUCOSE URINE: NEGATIVE MG/DL
HCT VFR BLD CALC: 36.8 % (ref 42–52)
HEMOGLOBIN: 12.2 GM/DL (ref 14–18)
IMMATURE GRANS (ABS): 0.02 THOU/MM3 (ref 0–0.07)
IMMATURE GRANULOCYTES: 0.3 %
KETONES, URINE: ABNORMAL
LEUKOCYTE ESTERASE, URINE: NEGATIVE
LYMPHOCYTES # BLD: 18.2 %
LYMPHOCYTES ABSOLUTE: 1.4 THOU/MM3 (ref 1–4.8)
MCH RBC QN AUTO: 30 PG (ref 26–33)
MCHC RBC AUTO-ENTMCNC: 33.2 GM/DL (ref 32.2–35.5)
MCV RBC AUTO: 90.4 FL (ref 80–94)
MONOCYTES # BLD: 6.3 %
MONOCYTES ABSOLUTE: 0.5 THOU/MM3 (ref 0.4–1.3)
NITRITE, URINE: NEGATIVE
NUCLEATED RED BLOOD CELLS: 0 /100 WBC
PH UA: 6.5 (ref 5–9)
PLATELET # BLD: 244 THOU/MM3 (ref 130–400)
PMV BLD AUTO: 10.3 FL (ref 9.4–12.4)
PROTEIN UA: NEGATIVE
RBC # BLD: 4.07 MILL/MM3 (ref 4.7–6.1)
SEG NEUTROPHILS: 72.7 %
SEGMENTED NEUTROPHILS ABSOLUTE COUNT: 5.6 THOU/MM3 (ref 1.8–7.7)
SPECIFIC GRAVITY, URINE: 1.02 (ref 1–1.03)
UROBILINOGEN, URINE: 0.2 EU/DL (ref 0–1)
WBC # BLD: 7.7 THOU/MM3 (ref 4.8–10.8)

## 2022-12-19 PROCEDURE — 6360000002 HC RX W HCPCS: Performed by: EMERGENCY MEDICINE

## 2022-12-19 PROCEDURE — 6370000000 HC RX 637 (ALT 250 FOR IP): Performed by: EMERGENCY MEDICINE

## 2022-12-19 PROCEDURE — 81003 URINALYSIS AUTO W/O SCOPE: CPT

## 2022-12-19 RX ORDER — OXYCODONE AND ACETAMINOPHEN 7.5; 325 MG/1; MG/1
1 TABLET ORAL 2 TIMES DAILY
Qty: 60 TABLET | Refills: 0 | Status: SHIPPED | OUTPATIENT
Start: 2022-12-19 | End: 2023-01-18

## 2022-12-19 RX ORDER — CYCLOBENZAPRINE HCL 10 MG
10 TABLET ORAL ONCE
Status: COMPLETED | OUTPATIENT
Start: 2022-12-19 | End: 2022-12-19

## 2022-12-19 RX ORDER — PREDNISONE 20 MG/1
40 TABLET ORAL DAILY
Qty: 20 TABLET | Refills: 0 | Status: SHIPPED | OUTPATIENT
Start: 2022-12-19 | End: 2022-12-29

## 2022-12-19 RX ORDER — OXYCODONE AND ACETAMINOPHEN 7.5; 325 MG/1; MG/1
1 TABLET ORAL ONCE
Status: COMPLETED | OUTPATIENT
Start: 2022-12-19 | End: 2022-12-19

## 2022-12-19 RX ORDER — LIDOCAINE 4 G/G
1 PATCH TOPICAL DAILY
Status: DISCONTINUED | OUTPATIENT
Start: 2022-12-19 | End: 2022-12-19 | Stop reason: HOSPADM

## 2022-12-19 RX ORDER — CYCLOBENZAPRINE HCL 10 MG
10 TABLET ORAL 3 TIMES DAILY PRN
Qty: 21 TABLET | Refills: 0 | Status: SHIPPED | OUTPATIENT
Start: 2022-12-19 | End: 2022-12-29

## 2022-12-19 RX ORDER — IBUPROFEN 400 MG/1
400 TABLET ORAL EVERY 6 HOURS PRN
Qty: 40 TABLET | Refills: 0 | Status: SHIPPED | OUTPATIENT
Start: 2022-12-19 | End: 2022-12-29

## 2022-12-19 RX ADMIN — OXYCODONE HYDROCHLORIDE AND ACETAMINOPHEN 1 TABLET: 7.5; 325 TABLET ORAL at 01:56

## 2022-12-19 RX ADMIN — HYDROMORPHONE HYDROCHLORIDE 1 MG: 1 INJECTION, SOLUTION INTRAMUSCULAR; INTRAVENOUS; SUBCUTANEOUS at 00:48

## 2022-12-19 RX ADMIN — CYCLOBENZAPRINE 10 MG: 10 TABLET, FILM COATED ORAL at 00:48

## 2022-12-19 ASSESSMENT — PAIN SCALES - GENERAL
PAINLEVEL_OUTOF10: 9

## 2022-12-19 ASSESSMENT — PAIN DESCRIPTION - PAIN TYPE: TYPE: ACUTE PAIN

## 2022-12-19 ASSESSMENT — PAIN DESCRIPTION - FREQUENCY: FREQUENCY: CONTINUOUS

## 2022-12-19 ASSESSMENT — PAIN DESCRIPTION - ONSET: ONSET: ON-GOING

## 2022-12-19 ASSESSMENT — PAIN DESCRIPTION - DESCRIPTORS: DESCRIPTORS: BURNING;SHARP;STABBING

## 2022-12-19 ASSESSMENT — PAIN DESCRIPTION - LOCATION: LOCATION: BACK

## 2022-12-19 NOTE — ED NOTES
Patient dc'd to home. AVS, follow up care, and prescriptions covered. Patient verbalized understanding.      Pippa Morales RN  12/19/22 0151

## 2022-12-19 NOTE — ED PROVIDER NOTES
[facility]  eMERGENCY dEPARTMENT eNCOUnter          CHIEF COMPLAINT       Chief Complaint   Patient presents with    Back Pain       Nurses Notes reviewed and I agree except as noted in the HPI. HISTORY OF PRESENT ILLNESS    Zuleima Santiago is a 38 y.o. male who presents low back pain left leg pain. Onset approximately 2 weeks. He was attempting to  a table and he felt a strain initially. Then he was washing his face and he felt a pop in his low back. Patient has been worked up here. He had ABIs performed by our cardiology department. Because he thought maybe this had to do with his medications. Those were within normal limits. Patient has seen his chiropractor. It gets better for a little bit and then worsens. Patient has not gone to his primary care physician for this. Patient denies any loss of bowel or bladder function. No loss of sensation to his groin no saddle anesthesia paresthesias, and the patient is able to ambulate. Patient is otherwise rating his pain a 9 out of 10. It is difficult for him to walk. Pain goes all the way down the posterior aspect of the left leg. Nothing seems to make it better or worse. Patient is otherwise resting comfortably on the cot no apparent distress no other physical complaints at this time. Narrative   PROCEDURE: VL ROSALVA BILATERAL LIMITED 1-2 LEVELS       CLINICAL INFORMATION: Claudication ([address]       COMPARISON: No prior study. TECHNIQUE: Bilateral arm and ankle pressures were measured and Ankle-brachial indices were calculated bilaterally. . Doppler waveforms were also generated for both ankles. FINDINGS: ABIs and Doppler waveforms are within normal limits. ROSALVA    RIGHT        ANGELICA----->1.01    PTA----->1.28        ROSALVA    LEFT        ANGELICA----->1.15    PTA----->1.25                   Impression   Normal study. **This report has been created using voice recognition software.   It may contain minor errors which are inherent in voice recognition technology. **       Final report electronically signed by Dr. Isabela Gallegos on 12/15/2022 10:08 AM     Cardiac catheterization  CONCLUSION:  Nonobstructive coronary artery disease. RECOMMENDATIONS:  1. Aggressive risk factor modification and medical management for  nonobstructive CAD. 2.  Check lipid panel. 3.  Currently on Zocor 20 mg p.o. daily, adjust based on lipid panel  findings. 4.  Aspirin 81 mg p.o. daily. Findings and plan of care discussed with the patient and family. Questions were answered. Vickie Zapata MD     D: 02/19/2020 10:55:20       T: 02/19/2020 11:02:21     [id number]  Job#: [id number]     Doc#: [id number]       REVIEW OF SYSTEMS     Review of Systems   Constitutional:  Negative for activity change, appetite change, diaphoresis, fatigue and unexpected weight change. HENT:  Negative for congestion, ear discharge, ear pain, hearing loss, mouth sores, nosebleeds, rhinorrhea, sinus pressure, sore throat, trouble swallowing and voice change. Eyes:  Negative for photophobia, pain, discharge, redness and itching. Respiratory:  Negative for cough, choking, chest tightness, shortness of breath and wheezing. Cardiovascular:  Negative for chest pain, palpitations and leg swelling. Gastrointestinal:  Negative for abdominal distention, abdominal pain, blood in stool, constipation, diarrhea, nausea and vomiting. Endocrine: Negative for polydipsia, polyphagia and polyuria. Genitourinary:  Negative for decreased urine volume, difficulty urinating, dysuria, enuresis, frequency, hematuria and urgency. Musculoskeletal:  Negative for arthralgias, back pain, gait problem, myalgias, neck pain and neck stiffness. Skin:  Negative for pallor and rash. Allergic/Immunologic: Negative for immunocompromised state.    Neurological:  Negative for dizziness, tremors, seizures, syncope, facial asymmetry, weakness, light-headedness, numbness and headaches. Hematological:  Negative for adenopathy. Does not bruise/bleed easily. Psychiatric/Behavioral:  Negative for agitation, decreased concentration, dysphoric mood, hallucinations and suicidal ideas. The patient is not nervous/anxious. PAST MEDICAL HISTORY    has a past medical history of AR (allergic rhinitis), Hypercholesteremia, and Hypertension. SURGICAL HISTORY      has a past surgical history that includes Stomach surgery (1978); [place] tooth extraction (1996); Inguinal hernia repair (Right, 6-13-14); and cardiovascular stress test.    CURRENT MEDICATIONS       Previous Medications    ASPIRIN 81 MG TABLET    Take 81 mg by mouth daily    ATORVASTATIN (LIPITOR) 40 MG TABLET    Take 0.5 tablets by mouth daily    CHOLECALCIFEROL (VITAMIN D-3 PO)    Take  by mouth daily. 1-2 tabs    COENZYME Q10 (CO Q 10) 10 MG CAPS    Take by mouth daily     CPAP MACHINE MISC    by Does not apply route Please change CPAP pressure to 6-11 cm H20.    FEXOFENADINE (ALLEGRA) 180 MG TABLET    Take 180 mg by mouth daily as needed. FLUTICASONE (FLONASE) 50 MCG/ACT NASAL SPRAY    1 spray by Nasal route as needed. LOSARTAN (COZAAR) 100 MG TABLET    Take 1 tablet by mouth nightly    MULTIPLE VITAMIN PO    Take  by mouth daily. ALLERGIES     has No Known Allergies. FAMILY HISTORY     He indicated that his mother is alive. He indicated that his father is alive. He indicated that the status of his maternal grandfather is unknown. He indicated that the status of his paternal grandmother is unknown. He indicated that the status of his paternal grandfather is unknown. He indicated that the status of his neg hx is unknown.   family history includes Heart Disease in his paternal grandmother; High Cholesterol in his father; Other in his father; Stroke in his maternal grandfather and paternal grandfather. SOCIAL HISTORY      reports that he has never smoked.  He has never used smokeless tobacco. He reports current alcohol use. He reports that he does not use drugs. PHYSICAL EXAM     INITIAL VITALS:  height is 5' 10\" (1.778 m) and weight is 180 lb (81.6 kg). His oral temperature is 98.7 °F (37.1 °C). His blood pressure is 178/95 (abnormal) and his pulse is 60. His respiration is 18 and oxygen saturation is 94%. Physical Exam  Vitals and nursing note reviewed. Constitutional:       General: He is not in acute distress. Appearance: He is well-developed. He is not diaphoretic. HENT:      Head: Normocephalic and atraumatic. Right Ear: External ear normal.      Left Ear: External ear normal.      Nose: Nose normal.   Eyes:      General: Lids are normal. No scleral icterus. Right eye: No discharge. Left eye: No discharge. Conjunctiva/sclera: Conjunctivae normal.      Right eye: No exudate. Left eye: No exudate. Pupils: Pupils are equal, round, and reactive to light. Neck:      Thyroid: No thyromegaly. Vascular: No JVD. Trachea: No tracheal deviation. Cardiovascular:      Rate and Rhythm: Normal rate and regular rhythm. Pulses: Normal pulses. Heart sounds: Normal heart sounds, S1 normal and S2 normal. No murmur heard. No friction rub. No gallop. Pulmonary:      Effort: Pulmonary effort is normal. No respiratory distress. Breath sounds: Normal breath sounds. No stridor. No wheezing or rales. Chest:      Chest wall: No tenderness. Abdominal:      General: Bowel sounds are normal. There is no distension. Palpations: Abdomen is soft. There is no mass. Tenderness: There is no abdominal tenderness. There is no guarding or rebound. Musculoskeletal:         General: No tenderness. Cervical back: Normal, normal range of motion and neck supple. Normal range of motion. Thoracic back: Normal.      Lumbar back: No swelling, edema, deformity, signs of trauma, lacerations, spasms, tenderness or bony tenderness.  Normal range of motion. Positive left straight leg raise test. Negative right straight leg raise test. No scoliosis. Comments: Straight leg positive on the left at 35 degrees. Straight leg on the left gave referred pain to the low back only. Lymphadenopathy:      Cervical: No cervical adenopathy. Skin:     General: Skin is warm and dry. Capillary Refill: Capillary refill takes less than 2 seconds. Findings: No bruising, ecchymosis, lesion or rash. Neurological:      General: No focal deficit present. Mental Status: He is alert and oriented to person, place, and time. GCS: GCS eye subscore is 4. GCS verbal subscore is 5. GCS motor subscore is 6. Cranial Nerves: Cranial nerves 2-12 are intact. No cranial nerve deficit. Sensory: Sensation is intact. No sensory deficit. Motor: Motor function is intact. No weakness. Coordination: Coordination is intact. Coordination normal.      Gait: Gait is intact. Gait normal.      Deep Tendon Reflexes: Reflexes are normal and symmetric. Reflexes normal.      Reflex Scores:       Tricep reflexes are 2+ on the right side and 2+ on the left side. Bicep reflexes are 2+ on the right side and 2+ on the left side. Brachioradialis reflexes are 2+ on the right side and 2+ on the left side. Patellar reflexes are 2+ on the right side and 2+ on the left side. Achilles reflexes are 2+ on the right side and 2+ on the left side. Comments: Right antalgic gait with ambulation,   Psychiatric:         Mood and Affect: Mood normal.         Speech: Speech normal.         Behavior: Behavior normal.         Thought Content:  Thought content normal.         Judgment: Judgment normal.         DIFFERENTIAL DIAGNOSIS:   Sciatica, perform syndrome, SI joint disease, lumbar intervertebral disc disorder    DIAGNOSTIC RESULTS     EKG: All EKG's are interpreted by the Emergency Department Physician who either signs or Co-signs this chart in the muscle relaxers and steroids. They are instructed to follow-up with the orthopedic [place] and go to the walk-in clinic at 7:30 AM.  They are also instructed to follow-up with a primary care physician. They are instructed return to the nearest emergency room should he develop any of those worsening type symptoms as mentioned previously. Patient and wife at bedside understood and agreed with the plan. Patient is subsequently discharged home in stable condition. Patient has what appears to be an L4-L5 bulge/herniation. Patient has been given anti-inflammatories muscle relaxers steroids and pain medication he is instructed to take those as prescribed. He is highly recommended to follow-up with both his primary care physician and call for an appointment within the next 1 to 2 days and with the orthopedic [place] and go to the walk-in clinic this morning at 7:30 AM.  Patient is instructed to take all medications as prescribed follow-up as directed return to the emergency room immediately for any new or worsening complaints    CRITICAL CARE:   None    CONSULTS:  None    PROCEDURES:  None    FINAL IMPRESSION      1. Bulging of lumbar intervertebral disc          DISPOSITION/PLAN   Discharge    PATIENT REFERRED TO:  MD OFELIA Juarez [facility] [address] [place] [address]  [phone]    Call in 1 day      [address]  [phone]  Go today  Walk-in clinic starts at 7:30 AM    DISCHARGE MEDICATIONS:  New Prescriptions    CYCLOBENZAPRINE (FLEXERIL) 10 MG TABLET    Take 1 tablet by mouth 3 times daily as needed for Muscle spasms    IBUPROFEN (IBU) 400 MG TABLET    Take 1 tablet by mouth every 6 hours as needed for Pain    OXYCODONE-ACETAMINOPHEN (PERCOCET) 7.5-325 MG PER TABLET    Take 1 tablet by mouth 2 times daily for 30 days.  Intended supply: 30 days    PREDNISONE (DELTASONE) 20 MG TABLET    Take 2 tablets by mouth daily for 10 days       (Please note that portions of this note were completed with a voice recognition program.  Efforts were made to edit the dictations but occasionally words are mis-transcribed.)    DO Shira Jorge DO  12/19/22 0136

## 2022-12-19 NOTE — ED NOTES
Patient ambulated 20 ft with assistance. Requiring a hand or hand rail to hold onto. Grimacing in pain the entire time. Pain still rated 9/10 even after recieving a 2nd mg of ivp dilaudid with flexeril and a lido patch.      Billie Blankenship RN  12/19/22 1019

## 2022-12-19 NOTE — DISCHARGE INSTRUCTIONS
Patient has what appears to be an L4-L5 bulge/herniation. Patient has been given anti-inflammatories muscle relaxers steroids and pain medication he is instructed to take those as prescribed.   He is highly recommended to follow-up with both his primary care physician and call for an appointment within the next 1 to 2 days and with the orthopedic [place] and go to the walk-in clinic this morning at 7:30 AM.  Patient is instructed to take all medications as prescribed follow-up as directed return to the emergency room immediately for any new or worsening complaints

## 2022-12-19 NOTE — ED NOTES
Introduced self to patient. Chief c/o LLE pain and numbness, s/p bending over at home and hearing something \"pop\". Onset of pain started around 2130. Pain rated 9/10 and described as sharp and burning. PIV placed and patient medicated per order (see mar). 1L NS infusing. Pain already subsiding. VSS on RA, AOx4.      Claudia Valladares, KENDALL  12/18/22 7936

## 2023-01-24 ENCOUNTER — TELEPHONE (OUTPATIENT)
Dept: CARDIOLOGY CLINIC | Age: 43
End: 2023-01-24

## 2023-01-24 NOTE — TELEPHONE ENCOUNTER
Pre op Risk Assessment    Procedure Laminectomy/decompression  Physician Dr. Colleen Alarcon   Date of surgery/procedure 02/07/2023    Last OV 09/13/2022  Last Stress 01/15/2019  Last Echo 09/27/2021  Last Cath 02/19/2020  Last Stent none in epic  Is patient on blood thinners ASA   Hold Meds/how many days ? ?     Fax: [phone] and [phone]

## 2023-01-24 NOTE — TELEPHONE ENCOUNTER
[address] [facility] 02/2020 revealed nonobstructive CAD for which medical management was recommended  Echo 09/2021 revealed an EF of 55%  Patient was last seen by [facility] post ER visit  Inquire about any persistent or new cardiac symptoms since then (ex. Chest pain, SOB, dyspnea on exertion. ..)  If none is reported, may proceed with low cardiac risk   May need further assessment if cardiac symptoms are reported

## 2023-01-25 NOTE — TELEPHONE ENCOUNTER
Spoke with pt, he states he has not had any cardiac symptoms and is doing good.  Form out for signature

## 2023-02-21 ENCOUNTER — OFFICE VISIT (OUTPATIENT)
Dept: CARDIOLOGY CLINIC | Age: 43
End: 2023-02-21
Payer: COMMERCIAL

## 2023-02-21 VITALS
HEART RATE: 85 BPM | WEIGHT: 188.6 LBS | SYSTOLIC BLOOD PRESSURE: 145 MMHG | BODY MASS INDEX: 27 KG/M2 | DIASTOLIC BLOOD PRESSURE: 101 MMHG | HEIGHT: 70 IN

## 2023-02-21 DIAGNOSIS — I25.10 CAD IN NATIVE ARTERY: Primary | ICD-10-CM

## 2023-02-21 PROCEDURE — 3077F SYST BP >= 140 MM HG: CPT | Performed by: INTERNAL MEDICINE

## 2023-02-21 PROCEDURE — 99214 OFFICE O/P EST MOD 30 MIN: CPT | Performed by: INTERNAL MEDICINE

## 2023-02-21 PROCEDURE — 3080F DIAST BP >= 90 MM HG: CPT | Performed by: INTERNAL MEDICINE

## 2023-02-21 PROCEDURE — G8484 FLU IMMUNIZE NO ADMIN: HCPCS | Performed by: INTERNAL MEDICINE

## 2023-02-21 PROCEDURE — 1036F TOBACCO NON-USER: CPT | Performed by: INTERNAL MEDICINE

## 2023-02-21 PROCEDURE — G8419 CALC BMI OUT NRM PARAM NOF/U: HCPCS | Performed by: INTERNAL MEDICINE

## 2023-02-21 PROCEDURE — G8427 DOCREV CUR MEDS BY ELIG CLIN: HCPCS | Performed by: INTERNAL MEDICINE

## 2023-02-21 RX ORDER — CARVEDILOL 3.12 MG/1
3.12 TABLET ORAL 2 TIMES DAILY
Qty: 60 TABLET | Refills: 3 | Status: SHIPPED | OUTPATIENT
Start: 2023-02-21

## 2023-02-21 RX ORDER — PREGABALIN 100 MG/1
CAPSULE ORAL
COMMUNITY
Start: 2023-01-02

## 2023-02-21 RX ORDER — CYCLOBENZAPRINE HCL 10 MG
TABLET ORAL
COMMUNITY
Start: 2023-02-07

## 2023-02-21 NOTE — PROGRESS NOTES
[id number] [facility] [address]  Dept: [phone]  Dept Fax: [phone]  Loc: [phone]    Visit Date: 2/21/2023    Mr. Nessa Upton is a 38 y.o. male  who presented for:  Hypertension   Nonobstructive CAD  HPI:   HPI   Preston Chow is a pleasant 38year old male patient who  has a past medical history of AR (allergic rhinitis), Hypercholesteremia, and Hypertension. [facility] 02/2020 revealed nonobstructive CAD for which medical management was recommended. Echo 09/2021 revealed an EF of 55%. He was seen in ER then by Dr Josh Barrientos in office back in 11/2022 for chest pain, \"indigestion\". Patient denies chest pain, shortness of breath, dyspnea on exertion, orthopnea, paroxysmal nocturnal dyspnea, palpitations, dizziness, syncope, weight gain or leg swelling. He denies recurrence of symptoms since that reported ER visit. /101. He had palpitations at time of ER visit, no recurrence per patient. Current Outpatient Medications:     ibuprofen (IBU) 400 MG tablet, Take 1 tablet by mouth every 6 hours as needed for Pain, Disp: 40 tablet, Rfl: 0    atorvastatin (LIPITOR) 40 MG tablet, Take 0.5 tablets by mouth daily, Disp: 45 tablet, Rfl: 0    losartan (COZAAR) 100 MG tablet, Take 1 tablet by mouth nightly, Disp: 90 tablet, Rfl: 3    CPAP Machine MISC, by Does not apply route Please change CPAP pressure to 6-11 cm H20., Disp: 1 each, Rfl: 0    aspirin 81 MG tablet, Take 81 mg by mouth daily, Disp: , Rfl:     Coenzyme Q10 (CO Q 10) 10 MG CAPS, Take by mouth daily , Disp: , Rfl:     Cholecalciferol (VITAMIN D-3 PO), Take  by mouth daily. 1-2 tabs, Disp: , Rfl:     fexofenadine (ALLEGRA) 180 MG tablet, Take 180 mg by mouth daily as needed. , Disp: , Rfl:     fluticasone (FLONASE) 50 MCG/ACT nasal spray, 1 spray by Nasal route as needed. , Disp: , Rfl:     MULTIPLE VITAMIN PO, Take  by mouth daily.   , Disp: , Rfl:     Past Medical History  Tang Ochoa  has a past medical history of AR (allergic rhinitis), Hypercholesteremia, and Hypertension. Social History  Tang Ochoa  reports that he has never smoked. He has never used smokeless tobacco. He reports current alcohol use. He reports that he does not use drugs. Family History  Tang Ochoa family history includes Heart Disease in his paternal grandmother; High Cholesterol in his father; Other in his father; Stroke in his maternal grandfather and paternal grandfather. Past Surgical History   Past Surgical History:   Procedure Laterality Date    CARDIOVASCULAR STRESS TEST      INGUINAL HERNIA REPAIR Right 6-13-14    [facility]    STOMACH SURGERY  1978    Pyloric Stenosis    WISDOM TOOTH EXTRACTION  1996       Review of Systems   Constitutional: Negative for chills and fever  HENT: Negative for congestion, sinus pressure, sneezing and sore throat. Eyes: Negative for pain, discharge, redness and itching. Respiratory: Negative for apnea, cough  Gastrointestinal: Negative for blood in stool, constipation, diarrhea   Endocrine: Negative for cold intolerance, heat intolerance, polydipsia. Genitourinary: Negative for dysuria, enuresis, flank pain and hematuria. Musculoskeletal: Negative for arthralgias, joint swelling and neck pain. Neurological: Negative for numbness and headaches. Psychiatric/Behavioral: Negative for agitation, confusion, decreased concentration and dysphoric mood. Objective: There were no vitals taken for this visit. Wt Readings from Last 3 Encounters:   12/18/22 180 lb (81.6 kg)   11/10/22 177 lb 9.6 oz (80.6 kg)   11/08/22 175 lb (79.4 kg)     BP Readings from Last 3 Encounters:   12/19/22 (!) 178/95   11/10/22 134/80   11/08/22 (!) 155/99       Nursing note and vitals reviewed. Physical Exam   Constitutional: Oriented to person, place, and time. Appears well-developed and well-nourished. ENT: Moist mucous membranes. No bleeding.  Tongue is midline. Head: Normocephalic and atraumatic. Eyes: EOM are normal. Pupils are equal, round, and reactive to light. Neck: Normal range of motion. Neck supple. No JVD present. Cardiovascular: Normal rate, regular rhythm, no murmur, no rubs, and intact distal pulses. Pulmonary/Chest: Effort normal and breath sounds normal. No respiratory distress. No wheezes. No rales. Abdominal: Soft. Bowel sounds are normal. No distension. There is no tenderness. Musculoskeletal: Normal range of motion. no edema. Neurological: Alert and oriented to person, place, and time. No cranial nerve deficit. Coordination normal.   Skin: Skin is warm and dry. Psychiatric: Normal mood and affect.        No results found for: CKTOTAL, CKMB, CKMBINDEX    Lab Results   Component Value Date/Time    WBC 7.7 12/18/2022 11:19 PM    RBC 4.07 12/18/2022 11:19 PM    HGB 12.2 12/18/2022 11:19 PM    HCT 36.8 12/18/2022 11:19 PM    MCV 90.4 12/18/2022 11:19 PM    MCH 30.0 12/18/2022 11:19 PM    MCHC 33.2 12/18/2022 11:19 PM     12/18/2022 11:19 PM    MPV 10.3 12/18/2022 11:19 PM       Lab Results   Component Value Date/Time     12/18/2022 11:19 PM    K 3.6 12/18/2022 11:19 PM    K 3.9 02/19/2020 06:16 AM     12/18/2022 11:19 PM    CO2 27 12/18/2022 11:19 PM    BUN 17 12/18/2022 11:19 PM    LABALBU 4.5 12/18/2022 11:19 PM    CREATININE 0.8 12/18/2022 11:19 PM    CALCIUM 8.8 12/18/2022 11:19 PM    LABGLOM >60 12/18/2022 11:19 PM    GLUCOSE 117 12/18/2022 11:19 PM       Lab Results   Component Value Date/Time    ALKPHOS 33 12/18/2022 11:19 PM    ALT 23 12/18/2022 11:19 PM    AST 26 12/18/2022 11:19 PM    PROT 6.4 12/18/2022 11:19 PM    BILITOT 0.3 12/18/2022 11:19 PM    BILIDIR <0.2 12/12/2022 08:14 AM    LABALBU 4.5 12/18/2022 11:19 PM       Lab Results   Component Value Date/Time    MG 1.9 12/18/2022 11:19 PM       Lab Results   Component Value Date    INR 0.98 02/19/2020         No results found for: LABA1C    Lab Results   Component Value Date/Time    TRIG 65 12/12/2022 08:14 AM    HDL 50 12/12/2022 08:14 AM    LDLCALC 75 12/12/2022 08:14 AM    LABVLDL 13 09/14/2022 09:46 AM       No results found for: TSH      Testing Reviewed:      I have individually reviewed the cardiac test below:    ECHO:   Results for orders placed or performed during the hospital encounter of 09/27/21   Echo 2D w doppler w color complete   Result Value Ref Range    Left Ventricular Ejection Fraction 55     LVEF MODALITY ECHO     Narrative    Transthoracic Echocardiography Report (TTE)     Demographics      Patient Name   Eladio Hester  Gender              Male                  R      MR #           [id number]        Race                                                      Ethnicity      Account #      [de-identified]        Room Number      Accession      [id number]       Date of Study       09/27/2021   Number      Date of Birth  1980       Referring Physician Merly Cross MD      Age            35 year(s)       Sonographer         Annmarie De La O RDCS,                                                       RVT                                      Interpreting        Zhang Cross MD                                   Physician     Procedure    Type of Study      TTE procedure:ECHOCARDIOGRAM COMPLETE 2D W DOPPLER W COLOR. Procedure Date  Date: 09/27/2021 Start: 08:20 AM    Study Location: Echo Lab  Technical Quality: Adequate visualization    Indications:Lower extremity edema. Additional Medical History:Hypertension, Hyperlipidemia, Family history of  heart disease, Dyspnea on exertion, Sleep apnea, PFO. Patient Status: Routine    Height: 70 inches Weight: 178 pounds BSA: 1.99 m^2 BMI: 25.54 kg/m^2    BP: 123/83 mmHg    Allergies    - No Known Allergies. Conclusions      Summary   Normal left ventricle size and systolic function.  Ejection fraction was estimated at 55%. There were no regional left ventricular wall motion   abnormalities and wall thickness was within normal limits. The right ventricular size was normal with normal systolic function and   wall thickness. Signature      ----------------------------------------------------------------   Electronically signed by Pee Canchola MD (Interpreting   physician) on 09/27/2021 at 01:19 PM   ----------------------------------------------------------------      Findings      Mitral Valve   The mitral valve structure was normal with normal leaflet separation. DOPPLER: The transmitral velocity was within the normal range with no   evidence for mitral stenosis. There was no evidence of mitral   regurgitation. Aortic Valve   The aortic valve was trileaflet with normal thickness and cuspal   separation. DOPPLER: Transaortic velocity was within the normal range with   no evidence of aortic stenosis. There was no evidence of aortic   regurgitation. Tricuspid Valve   The tricuspid valve structure was normal with normal leaflet separation. DOPPLER: There was no evidence of tricuspid stenosis. There was no   evidence of tricuspid regurgitation. Pulmonic Valve   The pulmonic valve leaflets exhibited normal thickness, no calcification,   and normal cuspal separation. DOPPLER: The transpulmonic velocity was   within the normal range with no evidence for regurgitation. Left Atrium   Left atrial size was normal.      Left Ventricle   Normal left ventricle size and systolic function. Ejection fraction was   estimated at 55%. There were no regional left ventricular wall motion   abnormalities and wall thickness was within normal limits. Right Atrium   Right atrial size was normal.      Right Ventricle   The right ventricular size was normal with normal systolic function and   wall thickness.       Pericardial Effusion   The pericardium was normal in appearance with no evidence of a pericardial   effusion. Pleural Effusion   No evidence of pleural effusion. Aorta / Great Vessels   -Aortic root dimension within normal limits.   -The Pulmonary artery is within normal limits. -IVC size is within normal limits with normal respiratory phasic changes.      M-Mode/2D Measurements & Calculations      LV Diastolic    LV Systolic Dimension:    AV Cusp Separation: 2.5 cmLA   Dimension: 5.2  3.8 cm                    Dimension: 3 cmAO Root   cm              LV Volume Diastolic:      Dimension: 3.5 cmLA Area: 17.2   LV FS:26.9 %    135.5 ml                  cm^2   LV PW           LV Volume Systolic: 62 ml   Diastolic: 0.8  LV EDV/LV EDV Index:   cm              135.5 ml/68 m^2LV ESV/LV   Septum          ESV Index: 62 ME/92 m^2   RV Diastolic Dimension: 2.9 cm   Diastolic: 0.7  EF Calculated: 54.2 %   cm                                        LA/Aorta: 0.86                                                LA volume/Index: 41.1 ml /21m^2     Doppler Measurements & Calculations      MV Peak E-Wave: 44.7 cm/s  AV Peak Velocity: 124 LVOT Peak Velocity: 86.7   MV Peak A-Wave: 28.8 cm/s  cm/s                  cm/s   MV E/A Ratio: 1.55         AV Peak Gradient:     LVOT Peak Gradient: 3   MV Peak Gradient: 0.8 mmHg 6.15 mmHg             mmHg      MV Deceleration Time: 310                        TV Peak E-Wave: 34.5 cm/s   msec                                             TV Peak A-Wave: 17.9 cm/s                                 IVRT: 95 msec         TV Peak Gradient: 0.48   MV E' Septal Velocity: 7.7                       mmHg   cm/s                                             TR Velocity:171 cm/s   MV A' Septal Velocity: 8.1 AV DVI (Vmax):0.7     TR Gradient:11.7 mmHg   cm/s                                             PV Peak Velocity: 51 cm/s   MV E' Lateral Velocity:                          PV Peak Gradient: 1.04   16.6 cm/s                                        mmHg   MV A' Lateral Velocity:   9.5 cm/s   E/E' septal: 5.81   E/E' lateral: 2.69     [email or web address] [id number]   ]    Cath:02/2020  FINDINGS:     HEMODYNAMICS:  LVEDP 60 mmHg. LEFT VENTRICULOGRAPHY:  No regional wall motion abnormality noted. Ejection fraction estimated at 60%. CORONARY ANGIOGRAM:  1. Left main coronary artery. Left main coronary artery is patent. There is mild diffuse disease in the distal part of left main. Left  main bifurcates into LCX and LAD. 2  Left circumflex artery. Proximal LCX is patent. OM1 is relatively  large branching vessel that has 30% lesion. Distal LCX is patent  without significant stenotic lesions. LPL has mild diffuse disease. 3  Left anterior descending artery. Proximal LAD has mild to moderately  calcified 30% lesion. Mid LAD is patent. No significant stenosis. Distal LAD is patent. No significant stenosis. D1 has an ostial 20%  lesion, moderate-sized vessel. 4.  Right coronary artery. Proximal RCA has 10% to 20% lesion. Mid RCA  has 30% to 40% lesion. Distal RCA is patent without significant  stenotic lesions. RCA is a dominant vessel. RPDA has mild diffuse  disease. This seems to be a codominant system. AssessmentPlan:     Zuleima Santiago is a pleasant 38year old male patient who  has a past medical history of AR (allergic rhinitis), Hypercholesteremia, and Hypertension. [facility] 02/2020 revealed nonobstructive CAD for which medical management was recommended. Echo 09/2021 revealed an EF of 55%. He was seen in ER then by Dr Raine Sumner in office back in 11/2022 for chest pain, \"indigestion\". Patient denies chest pain, shortness of breath, dyspnea on exertion, orthopnea, paroxysmal nocturnal dyspnea, palpitations, dizziness, syncope, weight gain or leg swelling. He denies recurrence of symptoms since that reported ER visit. /101.  He had palpitations at time of ER visit, no recurrence per patient. Nonobstructive CAD. Cath 02/2020 (pLAD 30% Calcified lesion, RCA 40% lesion, OM 30%)  Dyslipidemia  Hypertension, inadequately controlled   DEANDRA history     Echo 09/2021 revealed an EF of 55%  [facility] 02/2020 revealed nonobstructive CAD  Seen in ER then in office in 11/2022 for atypical chest pain  Patient now feels better  Advised to call office if symptoms recur (none at this time)  Patient was advised to report to the ER if has recurrent symptoms with specific instructions given about severity and duration of symptoms  Continue risk factor modification and medical management  ASA  Lipitor   Hyperlipidemia: on statins, followed periodically. Patient need periodic lipid and liver profile  Losartan  /101  The patient is advised to begin progressive daily aerobic exercise program  Add coreg   The patient was instructed to check the blood pressure at home, and record the readings. Patient will call office with blood pressure readings, will adjust patient's antihypertensive medications as needed accordingly     Above findings and plan of care were discussed with patient in details, patient's questions were answered.      Disposition:  RTC in 12 months             Electronically signed by Arline Savage MD, [facility]    2/20/2023 at 10:47 PM EST

## 2023-02-24 NOTE — PROGRESS NOTES
[place] [facility] Pulmonary, Critical Care and Sleep Medicine      Zain Francois         [id number]  2/27/2023   Chief Complaint   Patient presents with    Follow-up     1yr DEANDRA f/u w/[facility] download. Doing well. Pt of Dr. Fernando Young     PAP Download:   Original or initial AHI: 12.9     Date of initial study: 2/11/2020      Compliant  97%     Noncompliant 3 %     PAP Type APAP   Level  6/41owE5C   Avg Hrs/Day 6hrs 49mins  AHI: 0.4   Recorded compliance dates , 1/24/23  to 2/22/23   Machine/Mfg:   [x] ResMed    [] Respironics/Dreamstation   Interface:   [] Nasal    [x] Nasal pillows   [] FFM      Provider:      [x] SR-HME     []Chantelle     [] Kanwal    [] Sonal Dumas    [] Schwietermans               [] [employer]      [] Adaptive    [] [address]      [] Other    Neck Size: 16  Mallampati 1  ESS:  7  SAQLI: 84    Here is a scan of the most recent download:            Presentation:   Chuck Dean presents for sleep medicine follow up for obstructive sleep apnea  Since the last visit, Chuck Dean is doing well with PAP. He is sleeping well and feels rested. Equipment issues: The pressure is  acceptable, the mask is acceptable     Sleep issues:  Do you feel better? Yes  More rested? Yes   Better concentration? yes    Progress History:   Since last visit any new medical issues? Yes back surgery  New ER or hospital visits? yes  Any new or changes in medicines? No  Any new sleep medicines? No    Review of Systems -   Review of Systems   Constitutional:  Negative for activity change, appetite change, chills and fever. HENT:  Negative for congestion and postnasal drip. Eyes: Negative. Respiratory:  Negative for cough, chest tightness, shortness of breath, wheezing and stridor. Cardiovascular:  Negative for chest pain and leg swelling. Gastrointestinal:  Negative for diarrhea and nausea. Endocrine: Negative. Genitourinary: Negative. Musculoskeletal: Negative. Negative for arthralgias and back pain.    Skin: Negative. Allergic/Immunologic: Negative. Neurological: Negative. Negative for dizziness and light-headedness. Psychiatric/Behavioral: Negative. All other systems reviewed and are negative. Physical Exam:    BMI:  Body mass index is 27.09 kg/m². Wt Readings from Last 3 Encounters:   02/27/23 188 lb 12.8 oz (85.6 kg)   02/21/23 188 lb 9.6 oz (85.5 kg)   12/18/22 180 lb (81.6 kg)     Weight stable / unchanged  Vitals: /84 (Site: Right Upper Arm, Position: Sitting, Cuff Size: Medium Adult)   Pulse 95   Temp 98.3 °F (36.8 °C) (Oral)   Ht 5' 10\" (1.778 m)   Wt 188 lb 12.8 oz (85.6 kg)   SpO2 96% Comment: r/a  BMI 27.09 kg/m²       Physical Exam  Constitutional:       Appearance: Normal appearance. He is normal weight. HENT:      Head: Normocephalic and atraumatic. Right Ear: External ear normal.      Left Ear: External ear normal.      Nose: Nose normal.   Eyes:      Extraocular Movements: Extraocular movements intact. Conjunctiva/sclera: Conjunctivae normal.      Pupils: Pupils are equal, round, and reactive to light. Pulmonary:      Effort: Pulmonary effort is normal.   Musculoskeletal:      Cervical back: Normal range of motion and neck supple. Neurological:      General: No focal deficit present. Mental Status: He is alert and oriented to person, place, and time. Psychiatric:         Attention and Perception: Attention and perception normal.         Mood and Affect: Mood and affect normal.         Speech: Speech normal.         Behavior: Behavior normal. Behavior is cooperative. Thought Content: Thought content normal.         Cognition and Memory: Cognition normal.         Judgment: Judgment normal.         ASSESSMENT/DIAGNOSIS     Diagnosis Orders   1. DEANDRA (obstructive sleep apnea)  DME Order for CPAP as OP               Plan   Do you need any equipment today?  Yes update supplies  - Download reviewed and discussed with patient  - He  was advised to continue current positive airway pressure therapy with above described pressure. - He  advised to keep good compliance with current recommended pressure to get optimal results and clinical improvement  - Recommend 7-9 hours of sleep with PAP  - He was advised to call [employer] company regarding supplies if needed.   -He call my office for earlier appointment if needed for worsening of sleep symptoms.   - He was instructed on weight loss  - Andrew Benavides was educated about my impression and plan. Patient verbalizesunderstanding.   We will see Morgan Bellamy back in: 1 year with download    Information added by my medical assistant/LPN was reviewed today       Gilles Cortes, [address] for pulmonary and Sleep Medicine  2/27/2023

## 2023-02-27 ENCOUNTER — OFFICE VISIT (OUTPATIENT)
Dept: PULMONOLOGY | Age: 43
End: 2023-02-27
Payer: COMMERCIAL

## 2023-02-27 VITALS
TEMPERATURE: 98.3 F | DIASTOLIC BLOOD PRESSURE: 84 MMHG | HEART RATE: 95 BPM | BODY MASS INDEX: 27.03 KG/M2 | SYSTOLIC BLOOD PRESSURE: 136 MMHG | OXYGEN SATURATION: 96 % | WEIGHT: 188.8 LBS | HEIGHT: 70 IN

## 2023-02-27 DIAGNOSIS — G47.33 OSA (OBSTRUCTIVE SLEEP APNEA): Primary | ICD-10-CM

## 2023-02-27 PROCEDURE — G8484 FLU IMMUNIZE NO ADMIN: HCPCS | Performed by: PHYSICIAN ASSISTANT

## 2023-02-27 PROCEDURE — 1036F TOBACCO NON-USER: CPT | Performed by: PHYSICIAN ASSISTANT

## 2023-02-27 PROCEDURE — G8427 DOCREV CUR MEDS BY ELIG CLIN: HCPCS | Performed by: PHYSICIAN ASSISTANT

## 2023-02-27 PROCEDURE — 3079F DIAST BP 80-89 MM HG: CPT | Performed by: PHYSICIAN ASSISTANT

## 2023-02-27 PROCEDURE — 3075F SYST BP GE 130 - 139MM HG: CPT | Performed by: PHYSICIAN ASSISTANT

## 2023-02-27 PROCEDURE — 99213 OFFICE O/P EST LOW 20 MIN: CPT | Performed by: PHYSICIAN ASSISTANT

## 2023-02-27 PROCEDURE — G8419 CALC BMI OUT NRM PARAM NOF/U: HCPCS | Performed by: PHYSICIAN ASSISTANT

## 2023-02-27 ASSESSMENT — ENCOUNTER SYMPTOMS
STRIDOR: 0
BACK PAIN: 0
NAUSEA: 0
COUGH: 0
DIARRHEA: 0
ALLERGIC/IMMUNOLOGIC NEGATIVE: 1
CHEST TIGHTNESS: 0
SHORTNESS OF BREATH: 0
EYES NEGATIVE: 1
WHEEZING: 0

## 2023-02-28 RX ORDER — ATORVASTATIN CALCIUM 40 MG/1
20 TABLET, FILM COATED ORAL DAILY
Qty: 45 TABLET | Refills: 3 | Status: SHIPPED | OUTPATIENT
Start: 2023-02-28

## 2023-12-27 ENCOUNTER — HOSPITAL ENCOUNTER (OUTPATIENT)
Age: 43
Discharge: HOME | End: 2023-12-27
Payer: COMMERCIAL

## 2023-12-27 DIAGNOSIS — R07.9 CHEST PAIN, UNSPECIFIED TYPE: ICD-10-CM

## 2023-12-27 PROCEDURE — 93005 ELECTROCARDIOGRAM TRACING: CPT

## 2023-12-28 PROCEDURE — 93010 ELECTROCARDIOGRAM REPORT: CPT | Performed by: INTERNAL MEDICINE

## 2023-12-30 LAB
EKG ATRIAL RATE: 56 BPM
EKG P AXIS: 58 DEGREES
EKG P-R INTERVAL: 150 MS
EKG Q-T INTERVAL: 402 MS
EKG QRS DURATION: 86 MS
EKG QTC CALCULATION (BAZETT): 387 MS
EKG R AXIS: 4 DEGREES
EKG T AXIS: 4 DEGREES
EKG VENTRICULAR RATE: 56 BPM

## 2024-01-17 RX ORDER — CARVEDILOL 3.12 MG/1
TABLET ORAL
Qty: 180 TABLET | Refills: 0 | Status: SHIPPED | OUTPATIENT
Start: 2024-01-17

## 2024-02-07 RX ORDER — ATORVASTATIN CALCIUM 40 MG/1
20 TABLET, FILM COATED ORAL DAILY
Qty: 45 TABLET | Refills: 0 | Status: SHIPPED | OUTPATIENT
Start: 2024-02-07

## 2024-02-23 NOTE — PROGRESS NOTES
[place] for Pulmonary, Critical Care and Sleep Medicine      Bogdan Cortez         [id number]  2/27/2024   Chief Complaint   Patient presents with    Follow-up     1yr DEANDRA f/u w/[facility] download.  Doing well.  Needs script for PAP supplies.         Pt of Dr. Rio CHO Download:   Original or initial AHI: 12.9     Date of initial study: 2/11/2020    Compliant  100%     Noncompliant 0 %     PAP Type APAP   Level  6/11 cmH2O   Avg Hrs/Day 7hrs 14mins  AHI: 0.5   Recorded compliance dates , 1/23/24  to 2/21/24   Machine/Mfg:   [x] ResMed    [] Respironics/Dreamstation   Interface:   [] Nasal    [x] Nasal pillows   [] FFM      Provider:      [x] SR-HME     [][employer]     [] [employer]    [] [employer]    [] Schwietermans               [] [employer]      [] Adaptive    [] [place]:      [] Other    Neck Size: 15.25 inches  Mallampati 1  ESS:  3  SAQLI: 81    Here is a scan of the most recent download:            Presentation:   Bogdan presents for sleep medicine follow up for obstructive sleep apnea  Since the last visit, Bogdan is doing well with PAP.  He  is sleeping well and feels rested.  Mask fits well.      Equipment issues:  The pressure is  acceptable, the mask is acceptable     Sleep issues:  Do you feel better? Yes  More rested?Yes   Better concentration? yes    Progress History:   Since last visit any new medical issues? No  New ER or hospital visits? No  Any new or changes in medicines? No  Any new sleep medicines? No    Review of Systems -   Review of Systems   Constitutional:  Negative for activity change, appetite change, chills and fever.   HENT:  Negative for congestion and postnasal drip.    Eyes: Negative.    Respiratory:  Negative for cough, chest tightness, shortness of breath, wheezing and stridor.    Cardiovascular:  Negative for chest pain and leg swelling.   Gastrointestinal:  Negative for diarrhea and nausea.   Endocrine: Negative.    Genitourinary: Negative.    Musculoskeletal: Negative.  Negative

## 2024-02-25 NOTE — PROGRESS NOTES
[facility] [place] [facility]  [facility] CARDIOLOGY  [address]  [address]  [place] [address]  Dept: [phone]  Dept Fax: [phone]  Loc: [phone]    Visit Date: 2/27/2024    Mr. Cortez is a 43 y.o. male  who presented for:  Hypertension   Nonobstructive CAD  HPI:   HPI   Bogdan Cortez is a pleasant 43 year old male patient who  has a past medical history of AR (allergic rhinitis), Hypercholesteremia, and Hypertension. [facility] 02/2020 revealed nonobstructive CAD for which medical management was recommended. Echo 09/2021 revealed an EF of 55%. Has rare episode of chest discomfort, atypical. He was given protonix by his PCP. No recurrence since 12/2023. Patient denies dyspnea on exertion. He usually walks on treadmill, everyday.       Current Outpatient Medications:     atorvastatin (LIPITOR) 40 MG tablet, take 1/2 tablet by mouth once daily, Disp: 45 tablet, Rfl: 0    carvedilol (COREG) 3.125 MG tablet, take 1 tablet by mouth twice a day, Disp: 180 tablet, Rfl: 0    pantoprazole (PROTONIX) 40 MG tablet, take 1 tablet by mouth EVERY MORNING BEFORE BREAKFAST, Disp: 30 tablet, Rfl: 11    losartan (COZAAR) 100 MG tablet, Take 1 tablet by mouth nightly, Disp: 90 tablet, Rfl: 3    CPAP Machine MISC, by Does not apply route Please change CPAP pressure to 6-11 cm H20., Disp: 1 each, Rfl: 0    aspirin 81 MG tablet, Take 81 mg by mouth daily, Disp: , Rfl:     Coenzyme Q10 (CO Q 10) 10 MG CAPS, Take by mouth daily , Disp: , Rfl:     Cholecalciferol (VITAMIN D-3 PO), Take  by mouth daily. 1-2 tabs, Disp: , Rfl:     fexofenadine (ALLEGRA) 180 MG tablet, Take 180 mg by mouth daily as needed., Disp: , Rfl:     fluticasone (FLONASE) 50 MCG/ACT nasal spray, 1 spray by Nasal route as needed., Disp: , Rfl:     MULTIPLE VITAMIN PO, Take  by mouth daily.  , Disp: , Rfl:     Past Medical History  Bogdan  has a past medical history of AR (allergic rhinitis), Hypercholesteremia, and

## 2024-02-27 ENCOUNTER — OFFICE VISIT (OUTPATIENT)
Dept: PULMONOLOGY | Age: 44
End: 2024-02-27
Payer: COMMERCIAL

## 2024-02-27 ENCOUNTER — OFFICE VISIT (OUTPATIENT)
Dept: CARDIOLOGY CLINIC | Age: 44
End: 2024-02-27
Payer: COMMERCIAL

## 2024-02-27 VITALS
HEART RATE: 61 BPM | OXYGEN SATURATION: 98 % | BODY MASS INDEX: 26.4 KG/M2 | TEMPERATURE: 97.7 F | HEIGHT: 70 IN | WEIGHT: 184.4 LBS | SYSTOLIC BLOOD PRESSURE: 138 MMHG | DIASTOLIC BLOOD PRESSURE: 78 MMHG

## 2024-02-27 VITALS
BODY MASS INDEX: 26.48 KG/M2 | DIASTOLIC BLOOD PRESSURE: 78 MMHG | HEIGHT: 70 IN | WEIGHT: 185 LBS | HEART RATE: 76 BPM | SYSTOLIC BLOOD PRESSURE: 134 MMHG

## 2024-02-27 DIAGNOSIS — G47.33 OSA (OBSTRUCTIVE SLEEP APNEA): Primary | ICD-10-CM

## 2024-02-27 DIAGNOSIS — I25.10 CAD IN NATIVE ARTERY: Primary | ICD-10-CM

## 2024-02-27 PROCEDURE — 3075F SYST BP GE 130 - 139MM HG: CPT | Performed by: INTERNAL MEDICINE

## 2024-02-27 PROCEDURE — 99213 OFFICE O/P EST LOW 20 MIN: CPT | Performed by: PHYSICIAN ASSISTANT

## 2024-02-27 PROCEDURE — 3078F DIAST BP <80 MM HG: CPT | Performed by: PHYSICIAN ASSISTANT

## 2024-02-27 PROCEDURE — G8427 DOCREV CUR MEDS BY ELIG CLIN: HCPCS | Performed by: INTERNAL MEDICINE

## 2024-02-27 PROCEDURE — 1036F TOBACCO NON-USER: CPT | Performed by: PHYSICIAN ASSISTANT

## 2024-02-27 PROCEDURE — 99214 OFFICE O/P EST MOD 30 MIN: CPT | Performed by: INTERNAL MEDICINE

## 2024-02-27 PROCEDURE — G8419 CALC BMI OUT NRM PARAM NOF/U: HCPCS | Performed by: INTERNAL MEDICINE

## 2024-02-27 PROCEDURE — 1036F TOBACCO NON-USER: CPT | Performed by: INTERNAL MEDICINE

## 2024-02-27 PROCEDURE — 3075F SYST BP GE 130 - 139MM HG: CPT | Performed by: PHYSICIAN ASSISTANT

## 2024-02-27 PROCEDURE — G8427 DOCREV CUR MEDS BY ELIG CLIN: HCPCS | Performed by: PHYSICIAN ASSISTANT

## 2024-02-27 PROCEDURE — 3078F DIAST BP <80 MM HG: CPT | Performed by: INTERNAL MEDICINE

## 2024-02-27 PROCEDURE — G8484 FLU IMMUNIZE NO ADMIN: HCPCS | Performed by: PHYSICIAN ASSISTANT

## 2024-02-27 PROCEDURE — G8419 CALC BMI OUT NRM PARAM NOF/U: HCPCS | Performed by: PHYSICIAN ASSISTANT

## 2024-02-27 PROCEDURE — G8484 FLU IMMUNIZE NO ADMIN: HCPCS | Performed by: INTERNAL MEDICINE

## 2024-02-27 RX ORDER — CARVEDILOL 3.12 MG/1
3.12 TABLET ORAL 2 TIMES DAILY
Qty: 180 TABLET | Refills: 3 | Status: SHIPPED | OUTPATIENT
Start: 2024-02-27

## 2024-02-27 RX ORDER — ATORVASTATIN CALCIUM 40 MG/1
20 TABLET, FILM COATED ORAL DAILY
Qty: 45 TABLET | Refills: 3 | Status: SHIPPED | OUTPATIENT
Start: 2024-02-27

## 2024-02-27 ASSESSMENT — ENCOUNTER SYMPTOMS
EYES NEGATIVE: 1
BACK PAIN: 0
DIARRHEA: 0
COUGH: 0
WHEEZING: 0
CHEST TIGHTNESS: 0
STRIDOR: 0
SHORTNESS OF BREATH: 0
ALLERGIC/IMMUNOLOGIC NEGATIVE: 1
NAUSEA: 0

## 2024-02-27 NOTE — PROGRESS NOTES
1 year follow up. Med list up to date and pharmacy verified, scripts pended. Denies any cardiac concerns. Last EKG 12/2023

## 2024-05-10 ENCOUNTER — HOSPITAL ENCOUNTER (EMERGENCY)
Age: 44
Discharge: HOME | End: 2024-05-10
Attending: STUDENT IN AN ORGANIZED HEALTH CARE EDUCATION/TRAINING PROGRAM
Payer: COMMERCIAL

## 2024-05-10 ENCOUNTER — APPOINTMENT (OUTPATIENT)
Dept: GENERAL RADIOLOGY | Age: 44
End: 2024-05-10
Payer: COMMERCIAL

## 2024-05-10 VITALS
OXYGEN SATURATION: 97 % | BODY MASS INDEX: 25.77 KG/M2 | WEIGHT: 180 LBS | DIASTOLIC BLOOD PRESSURE: 90 MMHG | TEMPERATURE: 98.2 F | RESPIRATION RATE: 18 BRPM | SYSTOLIC BLOOD PRESSURE: 116 MMHG | HEIGHT: 70 IN | HEART RATE: 78 BPM

## 2024-05-10 DIAGNOSIS — I48.91 ATRIAL FIBRILLATION, UNSPECIFIED TYPE (HCC): Primary | ICD-10-CM

## 2024-05-10 LAB
ANION GAP SERPL CALC-SCNC: 12 MEQ/L (ref 8–16)
BASOPHILS ABSOLUTE: 0 THOU/MM3 (ref 0–0.1)
BASOPHILS NFR BLD AUTO: 0.6 %
BUN SERPL-MCNC: 11 MG/DL (ref 7–22)
CALCIUM SERPL-MCNC: 9.5 MG/DL (ref 8.5–10.5)
CHLORIDE SERPL-SCNC: 103 MEQ/L (ref 98–111)
CO2 SERPL-SCNC: 28 MEQ/L (ref 23–33)
CREAT SERPL-MCNC: 0.9 MG/DL (ref 0.4–1.2)
DEPRECATED RDW RBC AUTO: 42.1 FL (ref 35–45)
EOSINOPHIL NFR BLD AUTO: 2.5 %
EOSINOPHILS ABSOLUTE: 0.2 THOU/MM3 (ref 0–0.4)
ERYTHROCYTE [DISTWIDTH] IN BLOOD BY AUTOMATED COUNT: 13 % (ref 11.5–14.5)
GFR SERPL CREATININE-BSD FRML MDRD: > 90 ML/MIN/1.73M2
GLUCOSE SERPL-MCNC: 97 MG/DL (ref 70–108)
HCT VFR BLD AUTO: 39.7 % (ref 42–52)
HGB BLD-MCNC: 13.6 GM/DL (ref 14–18)
IMM GRANULOCYTES # BLD AUTO: 0.01 THOU/MM3 (ref 0–0.07)
IMM GRANULOCYTES NFR BLD AUTO: 0.2 %
LYMPHOCYTES ABSOLUTE: 1.8 THOU/MM3 (ref 1–4.8)
LYMPHOCYTES NFR BLD AUTO: 27.8 %
MAGNESIUM SERPL-MCNC: 2.2 MG/DL (ref 1.6–2.4)
MCH RBC QN AUTO: 30.4 PG (ref 26–33)
MCHC RBC AUTO-ENTMCNC: 34.3 GM/DL (ref 32.2–35.5)
MCV RBC AUTO: 88.8 FL (ref 80–94)
MONOCYTES ABSOLUTE: 0.7 THOU/MM3 (ref 0.4–1.3)
MONOCYTES NFR BLD AUTO: 11.2 %
NEUTROPHILS ABSOLUTE: 3.8 THOU/MM3 (ref 1.8–7.7)
NEUTROPHILS NFR BLD AUTO: 57.7 %
NRBC BLD AUTO-RTO: 0 /100 WBC
NT-PROBNP SERPL IA-MCNC: 142.2 PG/ML (ref 0–124)
OSMOLALITY SERPL CALC.SUM OF ELEC: 284.3 MOSMOL/KG (ref 275–300)
PLATELET # BLD AUTO: 268 THOU/MM3 (ref 130–400)
PMV BLD AUTO: 10.1 FL (ref 9.4–12.4)
POTASSIUM SERPL-SCNC: 3.8 MEQ/L (ref 3.5–5.2)
RBC # BLD AUTO: 4.47 MILL/MM3 (ref 4.7–6.1)
SODIUM SERPL-SCNC: 143 MEQ/L (ref 135–145)
T4 FREE SERPL-MCNC: 1.11 NG/DL (ref 0.93–1.68)
TROPONIN, HIGH SENSITIVITY: 12 NG/L (ref 0–12)
TROPONIN, HIGH SENSITIVITY: 13 NG/L (ref 0–12)
TSH SERPL DL<=0.005 MIU/L-ACNC: 2.96 UIU/ML (ref 0.4–4.2)
WBC # BLD AUTO: 6.5 THOU/MM3 (ref 4.8–10.8)

## 2024-05-10 PROCEDURE — 93005 ELECTROCARDIOGRAM TRACING: CPT

## 2024-05-10 PROCEDURE — 83735 ASSAY OF MAGNESIUM: CPT

## 2024-05-10 PROCEDURE — 71045 X-RAY EXAM CHEST 1 VIEW: CPT

## 2024-05-10 PROCEDURE — 93005 ELECTROCARDIOGRAM TRACING: CPT | Performed by: STUDENT IN AN ORGANIZED HEALTH CARE EDUCATION/TRAINING PROGRAM

## 2024-05-10 PROCEDURE — 80048 BASIC METABOLIC PNL TOTAL CA: CPT

## 2024-05-10 PROCEDURE — 84484 ASSAY OF TROPONIN QUANT: CPT

## 2024-05-10 PROCEDURE — 84443 ASSAY THYROID STIM HORMONE: CPT

## 2024-05-10 PROCEDURE — 6370000000 HC RX 637 (ALT 250 FOR IP)

## 2024-05-10 PROCEDURE — 6370000000 HC RX 637 (ALT 250 FOR IP): Performed by: STUDENT IN AN ORGANIZED HEALTH CARE EDUCATION/TRAINING PROGRAM

## 2024-05-10 PROCEDURE — 85025 COMPLETE CBC W/AUTO DIFF WBC: CPT

## 2024-05-10 PROCEDURE — 36415 COLL VENOUS BLD VENIPUNCTURE: CPT

## 2024-05-10 PROCEDURE — 83880 ASSAY OF NATRIURETIC PEPTIDE: CPT

## 2024-05-10 PROCEDURE — 96374 THER/PROPH/DIAG INJ IV PUSH: CPT

## 2024-05-10 PROCEDURE — 84439 ASSAY OF FREE THYROXINE: CPT

## 2024-05-10 PROCEDURE — 99285 EMERGENCY DEPT VISIT HI MDM: CPT

## 2024-05-10 PROCEDURE — 2500000003 HC RX 250 WO HCPCS

## 2024-05-10 RX ORDER — ASPIRIN 81 MG/1
81 TABLET, CHEWABLE ORAL ONCE
Status: COMPLETED | OUTPATIENT
Start: 2024-05-10 | End: 2024-05-10

## 2024-05-10 RX ORDER — DILTIAZEM HYDROCHLORIDE 60 MG/1
120 CAPSULE, EXTENDED RELEASE ORAL ONCE
Status: COMPLETED | OUTPATIENT
Start: 2024-05-10 | End: 2024-05-10

## 2024-05-10 RX ORDER — DILTIAZEM HYDROCHLORIDE 5 MG/ML
20 INJECTION INTRAVENOUS ONCE
Status: COMPLETED | OUTPATIENT
Start: 2024-05-10 | End: 2024-05-10

## 2024-05-10 RX ORDER — DILTIAZEM HYDROCHLORIDE EXTENDED-RELEASE TABLETS 120 MG/1
120 TABLET, EXTENDED RELEASE ORAL DAILY
Qty: 7 CAPSULE | Refills: 0 | Status: SHIPPED | OUTPATIENT
Start: 2024-05-10 | End: 2024-05-17

## 2024-05-10 RX ADMIN — DILTIAZEM HYDROCHLORIDE 120 MG: 60 CAPSULE, EXTENDED RELEASE ORAL at 23:15

## 2024-05-10 RX ADMIN — ASPIRIN 81 MG: 81 TABLET, CHEWABLE ORAL at 22:45

## 2024-05-10 RX ADMIN — DILTIAZEM HYDROCHLORIDE 20 MG: 5 INJECTION, SOLUTION INTRAVENOUS at 21:27

## 2024-05-10 ASSESSMENT — PAIN - FUNCTIONAL ASSESSMENT
PAIN_FUNCTIONAL_ASSESSMENT: NONE - DENIES PAIN
PAIN_FUNCTIONAL_ASSESSMENT: NONE - DENIES PAIN

## 2024-05-11 LAB
EKG Q-T INTERVAL: 308 MS
EKG Q-T INTERVAL: 376 MS
EKG QRS DURATION: 80 MS
EKG QRS DURATION: 98 MS
EKG QTC CALCULATION (BAZETT): 406 MS
EKG QTC CALCULATION (BAZETT): 412 MS
EKG R AXIS: 16 DEGREES
EKG R AXIS: 29 DEGREES
EKG T AXIS: 27 DEGREES
EKG T AXIS: 33 DEGREES
EKG VENTRICULAR RATE: 108 BPM
EKG VENTRICULAR RATE: 70 BPM

## 2024-05-11 PROCEDURE — 93010 ELECTROCARDIOGRAM REPORT: CPT | Performed by: INTERNAL MEDICINE

## 2024-05-11 NOTE — ED PROVIDER NOTES
[facility] EMERGENCY DEPT  EMERGENCY DEPARTMENT ENCOUNTER             Pt Name: Bogdan Cortez  MRN: [id number]  Birthdate 1980  Date of evaluation: 5/10/2024  Physician: Stephen Altamirano MD  Supervising Attending Physician: Maximo Yung DO         CHIEF COMPLAINT            Chief Complaint   Patient presents with    Tachycardia            HISTORY OF PRESENT ILLNESS    HPI  Bogdan Cortez is a 43 y.o. male who presents to the emergency department from home, as a walk in to the ED lobby for evaluation of palpitations.  Patient reports that around 415 this afternoon he was at track practice.  He states that he started to run a couple laps with athletes.  Patient reports that a large running he began to feel like his heart was beating really fast.  Patient states that he thought maybe this was did not get his treatments up.  Patient states that since then his heart is beating beating very fast.  Stated that he was concerned maybe he had A-fib so he come to the ED for evaluation.  He denies any chest pain shortness of breath.  Denies any abdominal pain.  Denies any recent changes in medications.  Denies any history of A-fib.  The patient has no other acute complaints at this time.                 PAST MEDICAL AND SURGICAL HISTORY      Past Medical History        Past Medical History:   Diagnosis Date    AR (allergic rhinitis)      Hypercholesteremia       BORDERLINE    Hypertension           Past Surgical History         Past Surgical History:   Procedure Laterality Date    BACK SURGERY   02/07/2023    CARDIOVASCULAR STRESS TEST        INGUINAL HERNIA REPAIR Right 06/13/2014     Robot assisted-[facility]    STOMACH SURGERY   1978     Pyloric Stenosis    WISDOM TOOTH EXTRACTION   1996               MEDICATIONS     Current Medication   No current facility-administered medications for this encounter.     Current Outpatient Medications:     atorvastatin (LIPITOR) 40 MG tablet, Take 0.5

## 2024-05-11 NOTE — ED PROVIDER NOTES
[facility] EMERGENCY DEPT  EMERGENCY DEPARTMENT ENCOUNTER          Pt Name: Bogdan Cortez  MRN: [id number]  Birthdate 1980  Date of evaluation: 5/10/2024  Physician: Stephen Altamirano MD  Supervising Attending Physician: Maximo Yung DO       CHIEF COMPLAINT       Chief Complaint   Patient presents with   • Tachycardia         HISTORY OF PRESENT ILLNESS    HPI  Bogdan Cortez is a 43 y.o. male who presents to the emergency department from home, as a walk in to the ED lobby for evaluation of palpitations.  Patient reports that around 415 this afternoon he was at track practice.  He states that he started to run a couple laps with athletes.  Patient reports that a large running he began to feel like his heart was beating really fast.  Patient states that he thought maybe this was did not get his treatments up.  Patient states that since then his heart is beating beating very fast.  Stated that he was concerned maybe he had A-fib so he come to the ED for evaluation.  He denies any chest pain shortness of breath.  Denies any abdominal pain.  Denies any recent changes in medications.  Denies any history of A-fib.  The patient has no other acute complaints at this time.            PAST MEDICAL AND SURGICAL HISTORY     Past Medical History:   Diagnosis Date   • AR (allergic rhinitis)    • Hypercholesteremia     BORDERLINE   • Hypertension      Past Surgical History:   Procedure Laterality Date   • BACK SURGERY  02/07/2023   • CARDIOVASCULAR STRESS TEST     • INGUINAL HERNIA REPAIR Right 06/13/2014    Robot assisted-[facility]   • STOMACH SURGERY  1978    Pyloric Stenosis   • WISDOM TOOTH EXTRACTION  1996         MEDICATIONS   No current facility-administered medications for this encounter.    Current Outpatient Medications:   •  atorvastatin (LIPITOR) 40 MG tablet, Take 0.5 tablets by mouth daily, Disp: 45 tablet, Rfl: 3  •  carvedilol (COREG) 3.125 MG tablet, Take 1 tablet by mouth 2

## 2024-05-14 ENCOUNTER — OFFICE VISIT (OUTPATIENT)
Dept: CARDIOLOGY CLINIC | Age: 44
End: 2024-05-14
Payer: COMMERCIAL

## 2024-05-14 VITALS
SYSTOLIC BLOOD PRESSURE: 138 MMHG | WEIGHT: 182 LBS | BODY MASS INDEX: 26.11 KG/M2 | HEART RATE: 67 BPM | DIASTOLIC BLOOD PRESSURE: 70 MMHG

## 2024-05-14 DIAGNOSIS — E78.5 HYPERLIPIDEMIA, UNSPECIFIED HYPERLIPIDEMIA TYPE: ICD-10-CM

## 2024-05-14 DIAGNOSIS — I10 ESSENTIAL HYPERTENSION: ICD-10-CM

## 2024-05-14 DIAGNOSIS — I48.0 PAF (PAROXYSMAL ATRIAL FIBRILLATION) (HCC): Primary | ICD-10-CM

## 2024-05-14 PROCEDURE — G8419 CALC BMI OUT NRM PARAM NOF/U: HCPCS | Performed by: INTERNAL MEDICINE

## 2024-05-14 PROCEDURE — 3075F SYST BP GE 130 - 139MM HG: CPT | Performed by: INTERNAL MEDICINE

## 2024-05-14 PROCEDURE — 93000 ELECTROCARDIOGRAM COMPLETE: CPT | Performed by: INTERNAL MEDICINE

## 2024-05-14 PROCEDURE — 99214 OFFICE O/P EST MOD 30 MIN: CPT | Performed by: INTERNAL MEDICINE

## 2024-05-14 PROCEDURE — 3078F DIAST BP <80 MM HG: CPT | Performed by: INTERNAL MEDICINE

## 2024-05-14 PROCEDURE — G8427 DOCREV CUR MEDS BY ELIG CLIN: HCPCS | Performed by: INTERNAL MEDICINE

## 2024-05-14 PROCEDURE — 1036F TOBACCO NON-USER: CPT | Performed by: INTERNAL MEDICINE

## 2024-05-14 NOTE — PROGRESS NOTES
Chief Complaint   Patient presents with    Coronary Artery Disease     Fu ED    Atrial Fibrillation       \Dr. Joey jensen and under treatment for HTN, HLP and had cath 2020 and nonopbstructiev    Pt here for ED fu afib RVR    Went to ED on may 10, 2024 for persistent fast HR after run track  Pat had FHX of atr fib ( father) and thought he might be in atr fib  Never had atr fib before  In the ed found to have atr fib RVR and started  on Cardizem  and  sent home    Pt felt heart rate went back into rhythm Sat was started on diltiazem,     Denied cp, sob, dizziness or edema    No more palpitation after Saturday and felt he might have gone back to NSR then    EKG done today     Nonsmoker    FHX  Father had atr fib    PMHX    Nonobstructive CAD. Cath 02/2020 (pLAD 30% Calcified lesion, RCA 40% lesion, OM 30%)  Dyslipidemia  Hypertension  DEANDRA history     Past Surgical History:   Procedure Laterality Date    BACK SURGERY  02/07/2023    CARDIOVASCULAR STRESS TEST      INGUINAL HERNIA REPAIR Right 06/13/2014    Robot assisted-[place]    STOMACH SURGERY  1978    Pyloric Stenosis    WISDOM TOOTH EXTRACTION  1996       No Known Allergies     Family History   Problem Relation Age of Onset    High Cholesterol Father     Other Father         afib    Stroke Maternal Grandfather     Heart Disease Paternal Grandmother     Stroke Paternal Grandfather     Prostate Cancer Neg Hx         Social History     Socioeconomic History    Marital status:      Spouse name: Huong    Number of children: Not on file    Years of education: Not on file    Highest education level: Not on file   Occupational History    Occupation: tech coordinator     Employer: [employer]   Tobacco Use    Smoking status: Never    Smokeless tobacco: Never    Tobacco comments:     Never smoker   Vaping Use    Vaping Use: Never used   Substance and Sexual Activity    Alcohol use: Yes     Types: 4 Cans of beer per week     Comment: moderate    Drug use: No    Sexual

## 2024-05-22 RX ORDER — DILTIAZEM HYDROCHLORIDE EXTENDED-RELEASE TABLETS 120 MG/1
120 TABLET, EXTENDED RELEASE ORAL DAILY
Qty: 90 CAPSULE | Refills: 3 | Status: SHIPPED | OUTPATIENT
Start: 2024-05-22 | End: 2025-05-17

## 2024-05-28 RX ORDER — DILTIAZEM HYDROCHLORIDE EXTENDED-RELEASE TABLETS 120 MG/1
120 TABLET, EXTENDED RELEASE ORAL DAILY
Qty: 90 CAPSULE | Refills: 3 | Status: SHIPPED | OUTPATIENT
Start: 2024-05-28 | End: 2024-05-29 | Stop reason: SDUPTHER

## 2024-10-15 ENCOUNTER — OFFICE VISIT (OUTPATIENT)
Dept: CARDIOLOGY CLINIC | Age: 44
End: 2024-10-15
Payer: COMMERCIAL

## 2024-10-15 VITALS
HEIGHT: 70 IN | SYSTOLIC BLOOD PRESSURE: 126 MMHG | DIASTOLIC BLOOD PRESSURE: 64 MMHG | WEIGHT: 184.6 LBS | BODY MASS INDEX: 26.43 KG/M2 | HEART RATE: 78 BPM

## 2024-10-15 DIAGNOSIS — I48.0 PAF (PAROXYSMAL ATRIAL FIBRILLATION) (HCC): Primary | ICD-10-CM

## 2024-10-15 PROCEDURE — 3078F DIAST BP <80 MM HG: CPT | Performed by: INTERNAL MEDICINE

## 2024-10-15 PROCEDURE — 3074F SYST BP LT 130 MM HG: CPT | Performed by: INTERNAL MEDICINE

## 2024-10-15 PROCEDURE — 99214 OFFICE O/P EST MOD 30 MIN: CPT | Performed by: INTERNAL MEDICINE

## 2024-10-15 PROCEDURE — G8419 CALC BMI OUT NRM PARAM NOF/U: HCPCS | Performed by: INTERNAL MEDICINE

## 2024-10-15 PROCEDURE — G8427 DOCREV CUR MEDS BY ELIG CLIN: HCPCS | Performed by: INTERNAL MEDICINE

## 2024-10-15 PROCEDURE — 1036F TOBACCO NON-USER: CPT | Performed by: INTERNAL MEDICINE

## 2024-10-15 PROCEDURE — G8484 FLU IMMUNIZE NO ADMIN: HCPCS | Performed by: INTERNAL MEDICINE

## 2024-10-15 RX ORDER — APIXABAN 5 MG/1
TABLET, FILM COATED ORAL
COMMUNITY
Start: 2024-07-30

## 2024-10-15 NOTE — PROGRESS NOTES
5 month follow up.    Last EKG done on 05/14/2024.    Denies chest pain, palpitations, dizziness, shortness of breath, and edema.     No cardiac concerns at this time.       
(Recent onset)   Nonobstructive CAD. Cath 02/2020 (pLAD 30% Calcified lesion, RCA 40% lesion, OM 30%)  Dyslipidemia  Hypertension  DEANDRA history     H/o CAD   Echo 09/2021 revealed an EF of 55%  LHC 02/2020 revealed nonobstructive CAD  He denies chest pain   Continue risk factor modification and medical management  /64  HR 78  Coreg  Cardizem   Lipitor  On 5/2024, patient was seen at ER and then by Dr Barboza for new onset atrial fibrillation  He was started on OAC  Has FH of strokes in grandfather on both sides of his family   On Eliquis, tolerated  Stop ASA  He denies palpitations   Apparently, patient was referred to [facility] EP service per his request. Patient is now followed by EP at [facility]      Above findings and plan of care were discussed with patient in details, patient's questions were answered.     Disposition:  RTC in 12 months             Electronically signed by Jil Ariza MD, FAC, [facility]    10/15/2024 at 11:08 AM EDT

## 2024-12-03 ENCOUNTER — TELEPHONE (OUTPATIENT)
Dept: PULMONOLOGY | Age: 44
End: 2024-12-03

## 2024-12-03 DIAGNOSIS — G47.33 OSA (OBSTRUCTIVE SLEEP APNEA): Primary | ICD-10-CM

## 2024-12-03 NOTE — TELEPHONE ENCOUNTER
Patient called in asking if we could place an order for him to get a travel Cpap from [facility]. Please advise thank you

## 2025-01-03 RX ORDER — CARVEDILOL 3.12 MG/1
3.12 TABLET ORAL 2 TIMES DAILY
Qty: 180 TABLET | Refills: 3 | Status: SHIPPED | OUTPATIENT
Start: 2025-01-03

## 2025-02-19 ENCOUNTER — PATIENT MESSAGE (OUTPATIENT)
Dept: CARDIOLOGY CLINIC | Age: 45
End: 2025-02-19

## 2025-02-20 ENCOUNTER — TELEPHONE (OUTPATIENT)
Dept: CARDIOLOGY CLINIC | Age: 45
End: 2025-02-20

## 2025-02-20 DIAGNOSIS — I10 ESSENTIAL HYPERTENSION: Primary | ICD-10-CM

## 2025-02-20 NOTE — TELEPHONE ENCOUNTER
Echo noted, ok  Prior ROSALVA was normal  May order upper extremity arterial doppler US \"to check the circulation\" on the affected side (side with symptoms and/or lower pressure)

## 2025-02-20 NOTE — TELEPHONE ENCOUNTER
Bogdan Cortez \"Henrique\"  P [facility] Clinical Staff (supporting Jil Ariza MD)9 hours ago (10:21 PM)     RW  Is there anything to be concerned about with this blood pressure reading.  In the picture I labeled Left and Right for the arm used to take the readings.  It seems that the left arm runs higher, but I wasn’t sure if I should be concerned about it or not.  My left foot has some numbness on the outside of my foot and the two small toes on that foot as well.  That may be from my back surgery but I wanted to see what your thoughts are whether it could be due to circulation issues or nerve issues as I initially thought.    FYI The last appointment I had in [facility] increased my Carvedilol to 6.25.

## 2025-02-20 NOTE — TELEPHONE ENCOUNTER
Bogdan Cortez \"Henrique\"  P [facility] Clinical Staff (supporting Jil Ariza MD)9 hours ago (10:21 PM)     RW  Is there anything to be concerned about with this blood pressure reading.  In the picture I labeled Left and Right for the arm used to take the readings.  It seems that the left arm runs higher, but I wasn’t sure if I should be concerned about it or not.  My left foot has some numbness on the outside of my foot and the two small toes on that foot as well.  That may be from my back surgery but I wanted to see what your thoughts are whether it could be due to circulation issues or nerve issues as I initially thought.    FYI The last appointment I had in [facility] increased my Carvedilol to 6.25.    Bogdan Cortez \"Henrique\"  P [facility] Clinical Staff (supporting Jil Ariza MD)9 hours ago (10:21 PM)

## 2025-02-25 ENCOUNTER — OFFICE VISIT (OUTPATIENT)
Dept: PULMONOLOGY | Age: 45
End: 2025-02-25
Payer: COMMERCIAL

## 2025-02-25 VITALS
DIASTOLIC BLOOD PRESSURE: 70 MMHG | HEART RATE: 58 BPM | SYSTOLIC BLOOD PRESSURE: 130 MMHG | HEIGHT: 70 IN | BODY MASS INDEX: 27.43 KG/M2 | WEIGHT: 191.6 LBS | OXYGEN SATURATION: 98 % | TEMPERATURE: 97.8 F

## 2025-02-25 DIAGNOSIS — G47.33 OSA (OBSTRUCTIVE SLEEP APNEA): Primary | ICD-10-CM

## 2025-02-25 DIAGNOSIS — I48.0 PAF (PAROXYSMAL ATRIAL FIBRILLATION) (HCC): ICD-10-CM

## 2025-02-25 PROBLEM — M51.369 BULGING OF LUMBAR INTERVERTEBRAL DISC: Status: ACTIVE | Noted: 2022-12-20

## 2025-02-25 PROCEDURE — 99214 OFFICE O/P EST MOD 30 MIN: CPT | Performed by: NURSE PRACTITIONER

## 2025-02-25 PROCEDURE — G8419 CALC BMI OUT NRM PARAM NOF/U: HCPCS | Performed by: NURSE PRACTITIONER

## 2025-02-25 PROCEDURE — G8427 DOCREV CUR MEDS BY ELIG CLIN: HCPCS | Performed by: NURSE PRACTITIONER

## 2025-02-25 PROCEDURE — 3078F DIAST BP <80 MM HG: CPT | Performed by: NURSE PRACTITIONER

## 2025-02-25 PROCEDURE — 3075F SYST BP GE 130 - 139MM HG: CPT | Performed by: NURSE PRACTITIONER

## 2025-02-25 PROCEDURE — 1036F TOBACCO NON-USER: CPT | Performed by: NURSE PRACTITIONER

## 2025-02-25 RX ORDER — AMPICILLIN TRIHYDRATE 250 MG
CAPSULE ORAL
COMMUNITY

## 2025-02-25 ASSESSMENT — ENCOUNTER SYMPTOMS
RESPIRATORY NEGATIVE: 1
ALLERGIC/IMMUNOLOGIC NEGATIVE: 1

## 2025-02-26 ENCOUNTER — HOSPITAL ENCOUNTER (OUTPATIENT)
Dept: INTERVENTIONAL RADIOLOGY/VASCULAR | Age: 45
Discharge: HOME | End: 2025-02-28
Attending: INTERNAL MEDICINE
Payer: COMMERCIAL

## 2025-02-26 DIAGNOSIS — I10 ESSENTIAL HYPERTENSION: ICD-10-CM

## 2025-02-26 PROCEDURE — 93931 UPPER EXTREMITY STUDY: CPT

## 2025-02-27 ENCOUNTER — TELEPHONE (OUTPATIENT)
Dept: CARDIOLOGY CLINIC | Age: 45
End: 2025-02-27

## 2025-02-27 NOTE — TELEPHONE ENCOUNTER
From results note US Upper extremities:     Jil Ariza MD  P [facility] Clinical Staff  Normal study (normal circulation in the arm)  Inform patient   0

## 2025-02-28 NOTE — TELEPHONE ENCOUNTER
Pt is wondering why they just checked the right arm as the left arm is the one that is always reading higher?

## 2025-03-03 NOTE — TELEPHONE ENCOUNTER
Called to patient  Reviewed test results  No questions at this time  Verbalized understanding  Closing encounter

## 2025-05-20 RX ORDER — ATORVASTATIN CALCIUM 40 MG/1
20 TABLET, FILM COATED ORAL DAILY
Qty: 45 TABLET | Refills: 2 | Status: SHIPPED | OUTPATIENT
Start: 2025-05-20

## 2025-06-05 ENCOUNTER — HOSPITAL ENCOUNTER (OUTPATIENT)
Age: 45
Discharge: HOME | End: 2025-06-05
Payer: COMMERCIAL

## 2025-06-05 DIAGNOSIS — D64.9 LOW HEMOGLOBIN AND LOW HEMATOCRIT: ICD-10-CM

## 2025-06-05 LAB
FOLATE SERPL-MCNC: > 20 NG/ML (ref 4.6–34.8)
IRON SERPL-MCNC: 79 UG/DL (ref 61–157)
VIT B12 SERPL-MCNC: 823 PG/ML (ref 232–1245)

## 2025-06-05 PROCEDURE — 83540 ASSAY OF IRON: CPT

## 2025-06-05 PROCEDURE — 82746 ASSAY OF FOLIC ACID SERUM: CPT

## 2025-06-05 PROCEDURE — 82607 VITAMIN B-12: CPT

## 2025-06-05 PROCEDURE — 36415 COLL VENOUS BLD VENIPUNCTURE: CPT
